# Patient Record
Sex: MALE | Race: WHITE | NOT HISPANIC OR LATINO | Employment: OTHER | URBAN - METROPOLITAN AREA
[De-identification: names, ages, dates, MRNs, and addresses within clinical notes are randomized per-mention and may not be internally consistent; named-entity substitution may affect disease eponyms.]

---

## 2017-06-01 ENCOUNTER — AMBULATORY - RIVER FALLS (OUTPATIENT)
Dept: FAMILY MEDICINE | Facility: CLINIC | Age: 67
End: 2017-06-01

## 2017-06-01 ENCOUNTER — COMMUNICATION - RIVER FALLS (OUTPATIENT)
Dept: FAMILY MEDICINE | Facility: CLINIC | Age: 67
End: 2017-06-01

## 2017-06-02 LAB
CHOLEST SERPL-MCNC: 158 MG/DL (ref 125–200)
CHOLEST/HDLC SERPL: 2.7 {RATIO}
CREAT SERPL-MCNC: 0.83 MG/DL (ref 0.7–1.25)
GLUCOSE BLD-MCNC: 112 MG/DL (ref 65–99)
HBA1C MFR BLD: 5.8 %
HDLC SERPL-MCNC: 59 MG/DL
LDLC SERPL CALC-MCNC: 75 MG/DL
NONHDLC SERPL-MCNC: 99 MG/DL
TRIGL SERPL-MCNC: 122 MG/DL

## 2017-06-14 ENCOUNTER — OFFICE VISIT - RIVER FALLS (OUTPATIENT)
Dept: FAMILY MEDICINE | Facility: CLINIC | Age: 67
End: 2017-06-14

## 2017-12-26 ENCOUNTER — AMBULATORY - RIVER FALLS (OUTPATIENT)
Dept: FAMILY MEDICINE | Facility: CLINIC | Age: 67
End: 2017-12-26

## 2017-12-27 ENCOUNTER — AMBULATORY - RIVER FALLS (OUTPATIENT)
Dept: FAMILY MEDICINE | Facility: CLINIC | Age: 67
End: 2017-12-27

## 2017-12-27 LAB — HBA1C MFR BLD: 5.6 %

## 2018-01-12 ENCOUNTER — OFFICE VISIT - RIVER FALLS (OUTPATIENT)
Dept: FAMILY MEDICINE | Facility: CLINIC | Age: 68
End: 2018-01-12

## 2018-09-10 ENCOUNTER — AMBULATORY - RIVER FALLS (OUTPATIENT)
Dept: FAMILY MEDICINE | Facility: CLINIC | Age: 68
End: 2018-09-10

## 2018-09-11 LAB
CHOLEST SERPL-MCNC: 147 MG/DL
CHOLEST/HDLC SERPL: 2.9 {RATIO}
CREAT SERPL-MCNC: 0.84 MG/DL (ref 0.7–1.25)
GLUCOSE BLD-MCNC: 114 MG/DL (ref 65–99)
HBA1C MFR BLD: 5.6 %
HDLC SERPL-MCNC: 51 MG/DL
LDLC SERPL CALC-MCNC: 77 MG/DL
NONHDLC SERPL-MCNC: 96 MG/DL
TRIGL SERPL-MCNC: 106 MG/DL

## 2018-09-13 ENCOUNTER — OFFICE VISIT - RIVER FALLS (OUTPATIENT)
Dept: FAMILY MEDICINE | Facility: CLINIC | Age: 68
End: 2018-09-13

## 2018-09-27 ENCOUNTER — OFFICE VISIT - RIVER FALLS (OUTPATIENT)
Dept: FAMILY MEDICINE | Facility: CLINIC | Age: 68
End: 2018-09-27

## 2018-09-27 ASSESSMENT — MIFFLIN-ST. JEOR: SCORE: 2225.45

## 2019-05-07 ENCOUNTER — AMBULATORY - RIVER FALLS (OUTPATIENT)
Dept: FAMILY MEDICINE | Facility: CLINIC | Age: 69
End: 2019-05-07

## 2019-05-08 LAB
25(OH)D3 SERPL-MCNC: 29 NG/ML (ref 30–100)
HBA1C MFR BLD: 5.7 %
PSA SERPL-MCNC: 0.7 NG/ML

## 2019-05-09 ENCOUNTER — COMMUNICATION - RIVER FALLS (OUTPATIENT)
Dept: FAMILY MEDICINE | Facility: CLINIC | Age: 69
End: 2019-05-09

## 2019-05-14 ENCOUNTER — OFFICE VISIT - RIVER FALLS (OUTPATIENT)
Dept: FAMILY MEDICINE | Facility: CLINIC | Age: 69
End: 2019-05-14

## 2019-10-16 ENCOUNTER — AMBULATORY - RIVER FALLS (OUTPATIENT)
Dept: FAMILY MEDICINE | Facility: CLINIC | Age: 69
End: 2019-10-16

## 2019-12-04 ENCOUNTER — AMBULATORY - RIVER FALLS (OUTPATIENT)
Dept: FAMILY MEDICINE | Facility: CLINIC | Age: 69
End: 2019-12-04

## 2019-12-05 ENCOUNTER — COMMUNICATION - RIVER FALLS (OUTPATIENT)
Dept: FAMILY MEDICINE | Facility: CLINIC | Age: 69
End: 2019-12-05

## 2019-12-05 LAB
BUN SERPL-MCNC: 19 MG/DL (ref 7–25)
BUN/CREAT RATIO - HISTORICAL: ABNORMAL (ref 6–22)
CALCIUM SERPL-MCNC: 9.8 MG/DL (ref 8.6–10.3)
CHLORIDE BLD-SCNC: 96 MMOL/L (ref 98–110)
CHOLEST SERPL-MCNC: 134 MG/DL
CHOLEST/HDLC SERPL: 2.7 {RATIO}
CO2 SERPL-SCNC: 31 MMOL/L (ref 20–32)
CREAT SERPL-MCNC: 0.9 MG/DL (ref 0.7–1.18)
EGFRCR SERPLBLD CKD-EPI 2021: 86 ML/MIN/1.73M2
GLUCOSE BLD-MCNC: 109 MG/DL (ref 65–99)
HDLC SERPL-MCNC: 49 MG/DL
LDLC SERPL CALC-MCNC: 66 MG/DL
NONHDLC SERPL-MCNC: 85 MG/DL
POTASSIUM BLD-SCNC: 4.6 MMOL/L (ref 3.5–5.3)
SODIUM SERPL-SCNC: 135 MMOL/L (ref 135–146)
TRIGL SERPL-MCNC: 109 MG/DL

## 2019-12-11 ENCOUNTER — OFFICE VISIT - RIVER FALLS (OUTPATIENT)
Dept: FAMILY MEDICINE | Facility: CLINIC | Age: 69
End: 2019-12-11

## 2019-12-11 ASSESSMENT — MIFFLIN-ST. JEOR: SCORE: 2222.73

## 2020-06-24 ENCOUNTER — OFFICE VISIT - RIVER FALLS (OUTPATIENT)
Dept: FAMILY MEDICINE | Facility: CLINIC | Age: 70
End: 2020-06-24

## 2020-06-24 ASSESSMENT — MIFFLIN-ST. JEOR: SCORE: 2241.78

## 2021-01-05 ENCOUNTER — OFFICE VISIT - RIVER FALLS (OUTPATIENT)
Dept: FAMILY MEDICINE | Facility: CLINIC | Age: 71
End: 2021-01-05

## 2021-01-06 ENCOUNTER — COMMUNICATION - RIVER FALLS (OUTPATIENT)
Dept: FAMILY MEDICINE | Facility: CLINIC | Age: 71
End: 2021-01-06

## 2021-01-06 LAB
BUN SERPL-MCNC: 15 MG/DL (ref 7–25)
BUN/CREAT RATIO - HISTORICAL: ABNORMAL (ref 6–22)
CALCIUM SERPL-MCNC: 9.9 MG/DL (ref 8.6–10.3)
CHLORIDE BLD-SCNC: 96 MMOL/L (ref 98–110)
CHOLEST SERPL-MCNC: 154 MG/DL
CHOLEST/HDLC SERPL: 2.9 {RATIO}
CO2 SERPL-SCNC: 34 MMOL/L (ref 20–32)
CREAT SERPL-MCNC: 0.88 MG/DL (ref 0.7–1.18)
EGFRCR SERPLBLD CKD-EPI 2021: 86 ML/MIN/1.73M2
GLUCOSE BLD-MCNC: 109 MG/DL (ref 65–99)
HBA1C MFR BLD: 5.6 %
HDLC SERPL-MCNC: 54 MG/DL
LDLC SERPL CALC-MCNC: 77 MG/DL
NONHDLC SERPL-MCNC: 100 MG/DL
POTASSIUM BLD-SCNC: 4.3 MMOL/L (ref 3.5–5.3)
SODIUM SERPL-SCNC: 136 MMOL/L (ref 135–146)
TRIGL SERPL-MCNC: 133 MG/DL

## 2021-07-20 ENCOUNTER — AMBULATORY - RIVER FALLS (OUTPATIENT)
Dept: FAMILY MEDICINE | Facility: CLINIC | Age: 71
End: 2021-07-20

## 2021-07-21 ENCOUNTER — COMMUNICATION - RIVER FALLS (OUTPATIENT)
Dept: FAMILY MEDICINE | Facility: CLINIC | Age: 71
End: 2021-07-21

## 2021-07-21 LAB — HBA1C MFR BLD: 5.5 %

## 2021-07-27 ENCOUNTER — OFFICE VISIT - RIVER FALLS (OUTPATIENT)
Dept: FAMILY MEDICINE | Facility: CLINIC | Age: 71
End: 2021-07-27

## 2021-07-27 ENCOUNTER — TRANSFERRED RECORDS (OUTPATIENT)
Dept: MULTI SPECIALTY CLINIC | Facility: CLINIC | Age: 71
End: 2021-07-27

## 2021-07-27 ASSESSMENT — MIFFLIN-ST. JEOR: SCORE: 2229.08

## 2022-02-11 VITALS
SYSTOLIC BLOOD PRESSURE: 137 MMHG | HEART RATE: 73 BPM | WEIGHT: 315 LBS | DIASTOLIC BLOOD PRESSURE: 82 MMHG | BODY MASS INDEX: 44.1 KG/M2 | HEIGHT: 71 IN

## 2022-02-11 VITALS
BODY MASS INDEX: 43.21 KG/M2 | WEIGHT: 309.8 LBS | HEART RATE: 68 BPM | DIASTOLIC BLOOD PRESSURE: 75 MMHG | SYSTOLIC BLOOD PRESSURE: 116 MMHG | TEMPERATURE: 97.6 F

## 2022-02-11 VITALS
BODY MASS INDEX: 43.68 KG/M2 | TEMPERATURE: 98.5 F | WEIGHT: 313.2 LBS | DIASTOLIC BLOOD PRESSURE: 85 MMHG | HEART RATE: 65 BPM | SYSTOLIC BLOOD PRESSURE: 130 MMHG

## 2022-02-11 VITALS
HEIGHT: 71 IN | BODY MASS INDEX: 44.1 KG/M2 | OXYGEN SATURATION: 97 % | BODY MASS INDEX: 44.52 KG/M2 | SYSTOLIC BLOOD PRESSURE: 116 MMHG | HEART RATE: 76 BPM | SYSTOLIC BLOOD PRESSURE: 125 MMHG | HEART RATE: 70 BPM | TEMPERATURE: 98.1 F | WEIGHT: 315 LBS | WEIGHT: 315 LBS | DIASTOLIC BLOOD PRESSURE: 82 MMHG | DIASTOLIC BLOOD PRESSURE: 90 MMHG

## 2022-02-11 VITALS
DIASTOLIC BLOOD PRESSURE: 79 MMHG | BODY MASS INDEX: 44.1 KG/M2 | HEIGHT: 71 IN | WEIGHT: 315 LBS | TEMPERATURE: 96.7 F | SYSTOLIC BLOOD PRESSURE: 123 MMHG | HEART RATE: 62 BPM

## 2022-02-11 VITALS
BODY MASS INDEX: 42.87 KG/M2 | WEIGHT: 307.4 LBS | TEMPERATURE: 97.2 F | HEART RATE: 62 BPM | DIASTOLIC BLOOD PRESSURE: 84 MMHG | SYSTOLIC BLOOD PRESSURE: 137 MMHG

## 2022-02-11 VITALS
SYSTOLIC BLOOD PRESSURE: 123 MMHG | HEIGHT: 71 IN | TEMPERATURE: 97.9 F | BODY MASS INDEX: 44.1 KG/M2 | WEIGHT: 315 LBS | DIASTOLIC BLOOD PRESSURE: 87 MMHG | HEART RATE: 81 BPM

## 2022-02-11 VITALS
WEIGHT: 315 LBS | TEMPERATURE: 97.4 F | BODY MASS INDEX: 45.47 KG/M2 | DIASTOLIC BLOOD PRESSURE: 72 MMHG | HEART RATE: 64 BPM | SYSTOLIC BLOOD PRESSURE: 116 MMHG

## 2022-02-16 NOTE — TELEPHONE ENCOUNTER
Entered by Beth Marroquin CMA on July 01, 2021 6:13:34 AM CDT  ---------------------  From: Beth Marroquin CMA   To: [id number]    Sent: 7/1/2021 6:13:33 AM CDT  Subject: Medication Management     ** Submitted: **  Order:lovastatin (lovastatin 40 mg oral tablet)  1 tab(s)  Oral  daily  Qty:  30 tab(s)        Refills:  0          Substitutions Allowed     [id number]    Signed by Beth Marroquin CMA  7/1/2021 11:13:00 AM [facility]    ** Submitted: **  Complete:lovastatin (lovastatin 40 mg oral tablet)   Signed by Beth Marroquin CMA  7/1/2021 11:13:00 AM [facility]    ** Not Approved:  **  lovastatin (LOVASTATIN 40MG TABLETS)  TAKE 1 TABLET BY MOUTH DAILY  Qty:  90 tab(s)        Days Supply:  90        Refills:  0          Substitutions Allowed     [id number]   Signed by Beth Marroquin CMA            ** Submitted: **  Order:losartan (losartan 25 mg oral tablet)  1 tab(s)  Oral  daily  Qty:  30 tab(s)        Refills:  0          Substitutions Allowed     Route To [facility] #[id number]    Signed by Beth Marroquin CMA  7/1/2021 11:12:00 AM [facility]    ** Submitted: **  Complete:losartan (losartan 25 mg oral tablet)   Signed by Beth Marroquin CMA  7/1/2021 11:12:00 AM [facility]    ** Not Approved:  **  losartan (LOSARTAN 25MG TABLETS)  TAKE 1 TABLET BY MOUTH DAILY  Qty:  90 tab(s)        Days Supply:  90        Refills:  0          Substitutions Allowed     [id number]   Signed by Beth Marroquin CMA            ** Submitted: **  Order:atenolol-chlorthalidone (atenolol-chlorthalidone 100 mg-25 mg oral tablet)  1 tab(s)  Oral  daily  Qty:  30 tab(s)        Refills:  0          Substitutions Allowed     Route To [facility] #[id number]    Signed by Beth Marroquin CMA  7/1/2021 11:12:00 AM [facility]    ** Submitted: **  Complete:atenolol-chlorthalidone (atenolol-chlorthalidone 100 mg-25 mg oral tablet)   Signed by Lopez  Beth CONCEPCION  7/1/2021 11:12:00 AM [facility]    ** Not Approved:  **  atenolol-chlorthalidone (ATENOLOL 100MG/CHLORTHAL 25MG TABS)  TAKE 1 TABLET BY MOUTH DAILY  Qty:  90 tab(s)        Days Supply:  90        Refills:  0          Substitutions Allowed     Route To Pharmacy - [facility] #[id number]   Signed by Beth Marroquin CMA            ------------------------------------------  From: [facility] #[id number]  To: Otilio Moya MD  Sent: June 30, 2021 4:41:16 PM CDT  Subject: Medication Management  Due: June 4, 2021 8:26:15 PM CDT     ** On Hold Pending Signature **     Dispensed Drug: atenolol-chlorthalidone (atenolol-chlorthalidone 100 mg-25 mg oral tablet), TAKE 1 TABLET BY MOUTH DAILY  Quantity: 90 tab(s)  Days Supply: 90  Refills: 0  Substitutions Allowed  Notes from Pharmacy:     ** On Hold Pending Signature **     Dispensed Drug: losartan (losartan 25 mg oral tablet), TAKE 1 TABLET BY MOUTH DAILY  Quantity: 90 tab(s)  Days Supply: 90  Refills: 0  Substitutions Allowed  Notes from Pharmacy:     ** On Hold Pending Signature **     Dispensed Drug: lovastatin (lovastatin 40 mg oral tablet), TAKE 1 TABLET BY MOUTH DAILY  Quantity: 90 tab(s)  Days Supply: 90  Refills: 0  Substitutions Allowed  Notes from Pharmacy:  ------------------------------------------1/5/21 cholesterol management  [facility] July

## 2022-02-16 NOTE — PROGRESS NOTES
Patient:   MARIAH FRANCOIS JR            MRN: [id number]            FIN: [id number]               Age:   68 years     Sex:  Male     :  1950   Associated Diagnoses:   Incomplete RBBB   Author:   Otilio Moya MD      Procedure   EKG procedure   Indication: preop.     Position: supine.     EKG findings   Interpretation: by primary care provider.     No change on resting EKG: since last exam.     Rhythm: sinus.     Axis: normal axis, normal configuration.     Intervals: NJ normal, QRS normal, QT normal.     P waves: normal.     ST-T-U complex.     Interpretation: abnormal incomplete rbbb.     Discussed: with patient.     Not normal EKG.        Impression and Plan   Diagnosis     Incomplete RBBB (DEG83-MW I45.10).     Orders

## 2022-02-16 NOTE — NURSING NOTE
Quick Intake Entered On:  12/11/2019 8:51 AM CST    Performed On:  12/11/2019 8:50 AM CST by Mayelin Bajwa               Summary   Advance Directive :   No   Height Measured :   71 in(Converted to: 5 ft 11 in, 180.34 cm)    Systolic Blood Pressure :   137 mmHg (HI)    Diastolic Blood Pressure :   82 mmHg (HI)    Mean Arterial Pressure :   100 mmHg   Peripheral Pulse Rate :   73 bpm   BP Site :   Right arm   BP Method :   Manual   HR Method :   Electronic   Race :      Languages :   English   Ethnicity :   Not  or    Mayelin Bajwa - 12/11/2019 8:50 AM CST

## 2022-02-16 NOTE — TELEPHONE ENCOUNTER
Entered by Luli Lomax LPN on October 19, 2021 9:38:38 AM CDT  ---------------------  From: Luli Lomax LPN   To: [facility] #[id number]    Sent: 10/19/2021 9:38:38 AM CDT  Subject: Medication Management     ** Not Approved: Refill not appropriate, Rx sent 7/27/21 #90 with 1 refill **  potassium chloride (POTASSIUM CL 20MEQ ER TABLETS)  TAKE 1 TABLET BY MOUTH DAILY  Qty:  90 tab(s)        Days Supply:  90        Refills:  0          Substitutions Allowed     Route To Pharmacy - [facility] #[id number]   Signed by Luli Lomax LPN            ------------------------------------------  From: [facility] #[id number]  To: Otilio Moya MD  Sent: October 14, 2021 5:15:29 AM CDT  Subject: Medication Management  Due: October 14, 2021 5:08:38 PM CDT     ** On Hold Pending Signature **     Dispensed Drug: potassium chloride (Potassium Chloride (Eqv-Klor-Con M20) 20 mEq oral tablet, extended release), TAKE 1 TABLET BY MOUTH DAILY  Quantity: 90 tab(s)  Days Supply: 90  Refills: 0  Substitutions Allowed  Notes from Pharmacy:  ------------------------------------------

## 2022-02-16 NOTE — PROGRESS NOTES
Patient:   MARIAH FRANCOIS JR            MRN: [id number]            FIN: [id number]               Age:   70 years     Sex:  Male     :  1950   Associated Diagnoses:   HTN (Hypertension); Vitamin D Deficiency; Prediabetes; TAWANDA (obstructive sleep apnea); Obesity; Nonalcoholic steatohepatitis (NUÑEZ); Adenoma; Aneurysm of thoracic aorta; Celiac artery dissection   Author:   Otilio Moya MD      Visit Information      Date of Service: 2021 10:28 am  Performing Location: [facility]  Encounter#: [id number]      Primary Care Provider (PCP):  Otilio Moya MD    NPI# [id number]      Referring Provider:  Otilio Moya MD# [id number]      Chief Complaint   2021 10:38 AM CST    HTN f/u. Pt is fasting.        History of Present Illness             Interval History   Some djd issues especially back Sees chiro  See cards for aortic dilitation  using cpap      Review of Systems   Constitutional:  Negative.    Eye:  Negative.    Ear/Nose/Mouth/Throat:  Negative.    Respiratory:  Negative.    Cardiovascular:  Negative.    Gastrointestinal:  Negative.    Genitourinary:  Negative.    Hematology/Lymphatics:  Negative.    Endocrine:  Negative.    Immunologic:  Negative.    Musculoskeletal:  Negative except as documented in history of present illness.    Integumentary:  Negative.    Neurologic:  Negative.       Health Status   Allergies:    Nonallergic Reactions (Selected)  Severity Not Documented  Zestril (Cough)   Medications:  (Selected)   Prescriptions  Prescribed  atenolol-chlorthalidone 100 mg-25 mg oral tablet: 1 tab(s), Oral, daily, # 30 tab(s), 0 Refill(s), Type: Maintenance, Pharmacy: [facility] #[id number], appt due, 1 tab(s) Oral daily, 71, in, 20 12:44:00 CDT, Height Measured, 324, lb, 20 12:44:00 CDT, Weight Measured  losartan 25 mg oral tablet: = 1 tab(s), Oral, daily, # 30 tab(s), 0 Refill(s), Type: Maintenance, Pharmacy: [facility] #[id number],  appt due, 1 tab(s) Oral daily, 71, in, 06/24/20 12:44:00 CDT, Height Measured, 324, lb, 06/24/20 12:44:00 CDT, Weight Measured  lovastatin 40 mg oral tablet: = 1 tab(s), Oral, daily, # 30 tab(s), 0 Refill(s), Type: Maintenance, Pharmacy: [facility] #[id number], appt due, 1 tab(s) Oral daily, 71, in, 06/24/20 12:44:00 CDT, Height Measured, 324, lb, 06/24/20 12:44:00 CDT, Weight Measured  omeprazole 20 mg oral delayed release capsule: = 1 cap(s) ( 20 mg ), Oral, daily, PRN: for heartburn, # 90 cap(s), 1 Refill(s), Type: Maintenance, Pharmacy: [facility] #[id number], 1 cap(s) Oral daily,PRN:for heartburn  potassium chloride 20 mEq oral tablet, extended release: = 1 tab(s) ( 20 mEq ), po, daily, # 90 tab(s), 1 Refill(s), Type: Maintenance, Pharmacy: [facility] #[id number], 1 tab(s) Oral daily, 71, in, 06/24/20 12:44:00 CDT, Height Measured, 324, lb, 06/24/20 12:44:00 CDT, Weight Measured  Documented Medications  Documented  Multiple Vitamins oral tablet: 1 tab(s), Oral, daily, 0 Refill(s), Type: Maintenance  aspirin 325 mg oral tablet: 1 tab(s) ( 325 mg ), po, daily, 0 Refill(s), Type: Maintenance,    Medications          *denotes recorded medication          *aspirin 325 mg oral tablet: 325 mg, 1 tab(s), po, daily, 0 Refill(s).          atenolol-chlorthalidone 100 mg-25 mg oral tablet: 1 tab(s), Oral, daily, 30 tab(s), 0 Refill(s).          losartan 25 mg oral tablet: 1 tab(s), Oral, daily, 30 tab(s), 0 Refill(s).          lovastatin 40 mg oral tablet: 1 tab(s), Oral, daily, 30 tab(s), 0 Refill(s).          *Multiple Vitamins oral tablet: 1 tab(s), Oral, daily, 0 Refill(s).          omeprazole 20 mg oral delayed release capsule: 20 mg, 1 cap(s), Oral, daily, PRN: for heartburn, 90 cap(s), 1 Refill(s).          potassium chloride 20 mEq oral tablet, extended release: 20 mEq, 1 tab(s), po, daily, 90 tab(s), 1 Refill(s).       Problem list:    All Problems (Selected)  Adenoma / ICD-9-.9 /  Confirmed  Aneurysm of thoracic aorta / SNOMED CT 8907875613 / Confirmed  Hemorrhoids, Internal / ICD-9-.0 / Confirmed  HTN (hypertension) / SNOMED CT 1210510837 / Confirmed  Lipids abnormal / ICD-9-.9 / Confirmed  Nonalcoholic steatohepatitis (NUÑEZ) / SNOMED CT 6387196167 / Confirmed  Obesity / SNOMED CT 9330656112 / Probable  TAWANDA (obstructive sleep apnea) / SNOMED CT 848765663 / Confirmed  ED (erectile dysfunction) / SNOMED CT 5673812454 / Confirmed  Prediabetes / SNOMED CT 792421137 / Confirmed  Incomplete RBBB / SNOMED CT 10353394 / Confirmed  Acquired polycythemia / SNOMED CT 162750528 / Confirmed  Seasonal allergies / SNOMED CT 909988860 / Confirmed  Status post skin graft / ICD-9-CM V42.3 / Confirmed  Vitamin D Deficiency / SNOMED CT 90976715 / Confirmed      Histories   Past Medical History:    Active  TAWANDA (obstructive sleep apnea) (238125289): Onset on 2015 at 64 years.  Status post skin graft (V42.3): Onset on 2012 at 62 years.  Adenoma (229.9): Onset in the month of 2009 at 58 years  Hemorrhoids, Internal (455.0): Onset in  at 58 years.  HTN (hypertension) (7269697025)  ED (erectile dysfunction) (2812813983)  Obesity (0177596906)  Seasonal allergies (538474556)  Resolved  History of chicken pox (287205123):  Resolved.   Family History:    Dementia  Mother (unknown, )  Leukemia  Father ()  Pacemaker  Mother (unknown, )     Procedure history:    Colonoscopy (SNOMED CT 701635705) performed by Stan Wall MD on 10/4/2018 at 68 Years.  Comments:  10/11/2018 8:28 AM CDT - Maru Ramirez  Indication:  History of polyps.  Sedation:  MAC.  Impression:  One 5 m polyp in rectum.  Diverticulosis in sigmoid colon.  Cardiac computed tomography for calcium scoring (SNOMED CT 1834847546) on 2016 at 65 Years.  Comments:  2016 10:42 AM CDT - Hien Rodas  Coronary arterial calcium score is 0  colonoscopy performed by Rivera Lechuga MD on 2015  at 64 Years.  Comments:  7/3/2015 3:17 PM CDT - Beatriz Meneses RN  Advanced adenoma    7/3/2015 2:58 PM CDT - Beatriz Meneses RN  Sedation: Propofol  Findings: Sigmoid diverticulosis, tubular adenoma  F/U repeat colonoscopy in 3 years.  Polysomnogram (SNOMED CT 257190176) on 5/13/2015 at 64 Years.  Comments:  6/17/2015 1:12 PM CDT - Renu Wheat CMA  AHI of 21/hr; increasing to 49/hr during REM  colonoscopy on 9/3/2009 at 58 Years.  Comments:  6/18/2010 1:06 PM CDT - Deyanira Slaughter  repeat 2012  BCC (basal cell carcinoma), left leg (ICD-9-.71) performed by Gil De La Cruz MD on 10/6/2008 at 58 Years.  BCC (basal cell carcinoma), right shoulder (ICD-9-.61) performed by Gil De La Cruz MD on 10/6/2008 at 58 Years.  BCC (basal cell carcinoma),  right skull (ICD-9-.91) performed by Gil De La Cruz MD on 10/6/2008 at 58 Years.  Stress test ECG in 1990 at 40 Years.  Tonsillectomy.   Social History:        Electronic Cigarette/Vaping Assessment            Electronic Cigarette Use: Never.      Alcohol Assessment: Current            Current, 1-2 times per week, 2 drinks/episode average.  2 drinks/episode maximum.      Tobacco Assessment: Past            Former smoker, quit more than 30 days ago, Snuff      Substance Abuse Assessment: Denies Substance Abuse            Never      Employment and Education Assessment            Retired      Home and Environment Assessment            Marital status: .  Spouse/Partner name: Oneyda..  3 children.      Nutrition and Health Assessment            Type of diet: Regular.      Exercise and Physical Activity Assessment: Does not exercise                     Comments:                      12/08/2016 - Hien Rodas                     No regular exercise        Physical Examination   Vital Signs   1/5/2021 10:38 AM CST Temperature Tympanic 97.4 DegF  LOW    Peripheral Pulse Rate 64 bpm    Pulse Site Radial artery    HR Method Manual    Systolic Blood  Pressure 116 mmHg    Diastolic Blood Pressure 72 mmHg    Mean Arterial Pressure 87 mmHg    BP Site Right arm    BP Method Manual      Measurements from flowsheet : Measurements   1/5/2021 10:38 AM CST    Weight Measured - Standard                326 lb     General:  Alert and oriented, No acute distress.    Eye:  Normal conjunctiva.    HENT:  Normocephalic, Tympanic membranes are clear, Oral mucosa is moist, No pharyngeal erythema.    Neck:  Supple, Non-tender, No lymphadenopathy, No thyromegaly.    Respiratory:  Breath sounds are equal, Symmetrical chest wall expansion.         Respirations: Are within normal limits.         Pattern: Regular.         Breath sounds: Bilateral, Within normal limits.    Cardiovascular:  Normal rate, Regular rhythm, No murmur, Normal peripheral perfusion, No edema.    Gastrointestinal:  Soft, Non-tender, Non-distended, Normal bowel sounds, No organomegaly.    Genitourinary:  No costovertebral angle tenderness.    Musculoskeletal:  degenerative changes noted.    Integumentary:  Warm, Dry, No rash, see below.    Neurologic:  Alert, Oriented.       Health Maintenance      Recommendations     Pending (in the next year)        OverDue           CBC due  08/21/13  and every 12  month(s)           CMP due  08/21/13  and every 12  month(s)           PSA due  08/21/13  and every 12  month(s)           Alcohol Misuse Screen (Male) due  12/08/17  and every 1  year(s)           Depression Screen (Male) due  12/08/17  and every 1  year(s)           Type 2 Diabetes Mellitus Screen (Male) due  05/07/20  and every 1  year(s)           Lipid Disorders Screen (Male) due  12/04/20  and every 1  year(s)        Due            Abdominal Aortic Aneurysm Screen (if hx of smoking) due  01/05/21  One-time only           Fall Risk Screen (Male) due  01/05/21  and every 1  year(s)           Hepatitis C Screen 7070-6062 (Male) due  01/05/21  One-time only           Lung Cancer Screen (Male) due  01/05/21  and  every 1  year(s)        Due In Future            Body Mass Index Check (Male) not due until  06/24/21  and every 1  year(s)           High Blood Pressure Screen (Male) not due until  06/24/21  and every 1  year(s)           Obesity Screen and Counseling (Male) not due until  06/24/21  and every 1  year(s)           Tobacco Use Screen (Male) not due until  06/24/21  and every 1  year(s)           Influenza Vaccine not due until  09/01/21  and every 1  year(s)           Colorectal Cancer Screen (Colonoscopy) (Male) not due until  10/04/21  and every 3  year(s)     Satisfied (in the past 1 year)        Satisfied            Body Mass Index Check (Male) on  06/24/20.           High Blood Pressure Screen (Male) on  06/24/20.           Influenza Vaccine on  09/15/20.           Obesity Screen and Counseling (Male) on  06/24/20.           Tobacco Use Screen (Male) on  06/24/20.          Review / Management   Results review      Impression and Plan   Diagnosis     HTN (Hypertension) (YAA99-VT I10).     Vitamin D Deficiency (VZD41-TI E55.9).     Prediabetes (UJH57-VP R73.09).     TAWANDA (obstructive sleep apnea) (RSG74-NN G47.33).     Obesity (KSV72-VX E66.9).     Nonalcoholic steatohepatitis (NUÑEZ) (RWE36-RV K75.81).     Adenoma (FUS59-KD D36.9).     Aneurysm of thoracic aorta (BJD17-VW I71.2).     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     Course:  Progressing as expected.    Plan:  1.  Hypertension under reasonable control on atenolol chlorthalidone and losartan  2.  Hyperlipidemia under reasonable control with lovastatin  3 chronic reflux stable on omeprazole No. 4 obesity needs to work on weight loss  5.  Prediabetes needs an A1c  6.  History of adenomatous colon polyps due for colonoscopy in 2023  7 polycythemia negative follow-ups  8.  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    Patient Instructions:       Counseled: Patient, Regarding diagnosis, Regarding treatment, Regarding medications, Diet, Activity.

## 2022-02-16 NOTE — PROGRESS NOTES
Patient:   MARIAH FRANCOIS JR            MRN: [id number]            FIN: [id number]               Age:   69 years     Sex:  Male     :  1950   Associated Diagnoses:   HTN (Hypertension); Vitamin D Deficiency; Prediabetes; TAWANDA (obstructive sleep apnea); Obesity; Nonalcoholic steatohepatitis (NUÑEZ); Adenoma; Aneurysm of thoracic aorta; Celiac artery dissection   Author:   Otilio Moya MD      Visit Information      Date of Service: 2019 08:42 am  Performing Location: [facility]  Encounter#: [id number]      Primary Care Provider (PCP):  Otilio Moya MD    NPLENIN# [id number]      Referring Provider:  Otilio Moya MD# [id number]      Chief Complaint   2019 8:49 AM CST   Pt here today for med check and lab f/up        History of Present Illness             The patient presents for follow-up evaluation of hypertension.  The context of the hypertension: blood pressure was maintained within the target range.  Exacerbating factors consist of none.  Relieving factors consist of medication.  Associated symptoms consist of none.        Interval History   Cholesterol Management   Total cholesterol results < 200 mg/dL (optimal).  LDL cholesterol results < 100 mg/dL (optimal).  Associated symptoms characterized by no fatigue, chest pain, joint pain, muscle weakness or myalgias.     Some djd issues especially back Sees chiro  See cards for aortic dilitation  Not using cpap      Review of Systems   Constitutional:  Negative.    Eye:  Negative.    Ear/Nose/Mouth/Throat:  Negative.    Respiratory:  Negative.    Cardiovascular:  Negative.    Gastrointestinal:  Negative.    Genitourinary:  Negative.    Hematology/Lymphatics:  Negative.    Endocrine:  Negative.    Immunologic:  Negative.    Musculoskeletal:  Negative except as documented in history of present illness.    Integumentary:  Negative.    Neurologic:  Negative.       Health Status   Allergies:    Nonallergic Reactions  (Selected)  Severity Not Documented  Zestril (Cough)   Medications:  (Selected)   Prescriptions  Prescribed  atenolol-chlorthalidone 100 mg-25 mg oral tablet: 1 tab(s), po, daily, # 90 tab(s), 1 Refill(s), Type: Maintenance, Pharmacy: [facility] [id number], 1 tab(s) Oral daily  losartan 25 mg oral tablet: = 1 tab(s) ( 25 mg ), Oral, daily, # 90 tab(s), 1 Refill(s), Type: Maintenance, Pharmacy: [facility] [id number], 1 tab(s) Oral daily  lovastatin 40 mg oral tablet: = 1 tab(s) ( 40 mg ), Oral, daily, # 90 tab(s), 1 Refill(s), Type: Maintenance, Pharmacy: [facility] [id number], 1 tab(s) Oral daily  omeprazole 20 mg oral delayed release capsule: = 1 cap(s) ( 20 mg ), Oral, daily, PRN: for heartburn, # 90 cap(s), 1 Refill(s), Type: Maintenance, Pharmacy: [facility] [id number], 1 cap(s) Oral daily,PRN:for heartburn  potassium chloride 20 mEq oral tablet, extended release: = 1 tab(s) ( 20 mEq ), po, daily, # 90 tab(s), 1 Refill(s), Type: Maintenance, Pharmacy: [facility] [id number], 1 tab(s) Oral daily  Documented Medications  Documented  Multiple Vitamins oral tablet: 1 tab(s), Oral, daily, 0 Refill(s), Type: Maintenance  aspirin 325 mg oral tablet: 1 tab(s) ( 325 mg ), po, daily, 0 Refill(s), Type: Maintenance,    Medications          *denotes recorded medication          *aspirin 325 mg oral tablet: 325 mg, 1 tab(s), po, daily, 0 Refill(s).          atenolol-chlorthalidone 100 mg-25 mg oral tablet: 1 tab(s), po, daily, 90 tab(s), 1 Refill(s).          losartan 25 mg oral tablet: 25 mg, 1 tab(s), Oral, daily, 90 tab(s), 1 Refill(s).          lovastatin 40 mg oral tablet: 40 mg, 1 tab(s), Oral, daily, 90 tab(s), 1 Refill(s).          *Multiple Vitamins oral tablet: 1 tab(s), Oral, daily, 0 Refill(s).          omeprazole 20 mg oral delayed release capsule: 20 mg, 1 cap(s), Oral, daily, PRN: for heartburn, 90 cap(s), 1 Refill(s).          potassium chloride 20 mEq oral tablet, extended release: 20  mEq, 1 tab(s), po, daily, 90 tab(s), 1 Refill(s).       Problem list:    All Problems  Adenoma / ICD-9-.9 / Confirmed  Aneurysm of thoracic aorta / SNOMED CT 7449710510 / Confirmed  Hemorrhoids, Internal / ICD-9-.0 / Confirmed  HTN (hypertension) / SNOMED CT 2563502897 / Confirmed  Lipids abnormal / ICD-9-.9 / Confirmed  Nonalcoholic steatohepatitis (NUÑEZ) / SNOMED CT 9413099591 / Confirmed  Obesity / SNOMED CT 0754355445 / Probable  TAWANDA (obstructive sleep apnea) / SNOMED CT 220702736 / Confirmed  ED (erectile dysfunction) / SNOMED CT 8017652111 / Confirmed  Prediabetes / SNOMED CT 106452771 / Confirmed  Incomplete RBBB / SNOMED CT 84328191 / Confirmed  Acquired polycythemia / SNOMED CT 400383123 / Confirmed  Seasonal allergies / SNOMED CT 087985066 / Confirmed  Status post skin graft / ICD-9-CM V42.3 / Confirmed  Vitamin D Deficiency / SNOMED CT 61507149 / Confirmed  Resolved: History of chicken pox / SNOMED CT 105116476      Histories   Past Medical History:    Active  TAWANDA (obstructive sleep apnea) (395441776): Onset on 2015 at 64 years.  Status post skin graft (V42.3): Onset on 2012 at 62 years.  Adenoma (229.9): Onset in the month of 2009 at 58 years  Hemorrhoids, Internal (455.0): Onset in  at 58 years.  HTN (hypertension) (4492353630)  ED (erectile dysfunction) (7648712012)  Obesity (1675095228)  Seasonal allergies (993737158)  Resolved  History of chicken pox (751728457):  Resolved.   Family History:    Dementia  Mother (unknown, )  Leukemia  Father ()  Pacemaker  Mother (unknown, )     Procedure history:    Colonoscopy (SNOMED CT 810664391) performed by Stan Wall MD on 10/4/2018 at 68 Years.  Comments:  10/11/2018 8:28 AM LILIANAT - Maru Ramirez  Indication:  History of polyps.  Sedation:  MAC.  Impression:  One 5 m polyp in rectum.  Diverticulosis in sigmoid colon.  Cardiac computed tomography for calcium scoring (SNOMED CT 5414104067) on  6/20/2016 at 65 Years.  Comments:  6/23/2016 10:42 AM CDT - Hien Rodas  Coronary arterial calcium score is 0  colonoscopy performed by Rivera Lechuga MD on 6/24/2015 at 64 Years.  Comments:  7/3/2015 3:17 PM CDT - Beatriz Meneses RN  Advanced adenoma    7/3/2015 2:58 PM CDT - Beatriz Meneses RN  Sedation: Propofol  Findings: Sigmoid diverticulosis, tubular adenoma  F/U repeat colonoscopy in 3 years.  Polysomnogram (SNOMED CT 933129386) on 5/13/2015 at 64 Years.  Comments:  6/17/2015 1:12 PM CDT - Renu Wheat CMA  AHI of 21/hr; increasing to 49/hr during REM  colonoscopy on 9/3/2009 at 58 Years.  Comments:  6/18/2010 1:06 PM CDT - Deyanira Slaughter  repeat 2012  BCC (basal cell carcinoma), left leg (ICD-9-.71) performed by Gil De La Cruz MD on 10/6/2008 at 58 Years.  BCC (basal cell carcinoma), right shoulder (ICD-9-.61) performed by Gil De La Cruz MD on 10/6/2008 at 58 Years.  BCC (basal cell carcinoma),  right skull (ICD-9-.91) performed by Gil De La Cruz MD on 10/6/2008 at 58 Years.  Stress test ECG in 1990 at 40 Years.  Tonsillectomy.   Social History:        Alcohol Assessment: Current            Current, 1-2 times per week, 2 drinks/episode average.  2 drinks/episode maximum.      Tobacco Assessment: Past            Former smoker, Oral      Substance Abuse Assessment: Denies Substance Abuse            Never      Employment and Education Assessment            Retired      Home and Environment Assessment            Marital status: .  Spouse/Partner name: Oneyda..  3 children.      Nutrition and Health Assessment            Type of diet: Regular.      Exercise and Physical Activity Assessment: Does not exercise                     Comments:                      12/08/2016 - Hien Rodas                     No regular exercise        Physical Examination   Vital Signs   12/11/2019 8:50 AM CST Peripheral Pulse Rate 73 bpm    HR Method Electronic    Systolic Blood Pressure  137 mmHg  HI    Diastolic Blood Pressure 82 mmHg  HI    Mean Arterial Pressure 100 mmHg    BP Site Right arm    BP Method Manual      Measurements from flowsheet : Measurements   12/11/2019 8:50 AM CST Height Measured - Standard 71 in   12/11/2019 8:49 AM CST Height Measured - Standard 71 in    Weight Measured - Standard 319.8 lb    BSA 2.69 m2    Body Mass Index 44.6 kg/m2  HI      General:  Alert and oriented, No acute distress.    Eye:  Normal conjunctiva.    HENT:  Normocephalic, Tympanic membranes are clear, Oral mucosa is moist, No pharyngeal erythema.    Neck:  Supple, Non-tender, No lymphadenopathy, No thyromegaly.    Respiratory:  Breath sounds are equal, Symmetrical chest wall expansion.         Respirations: Are within normal limits.         Pattern: Regular.         Breath sounds: Bilateral, Within normal limits.    Cardiovascular:  Normal rate, Regular rhythm, No murmur, Normal peripheral perfusion, No edema.    Gastrointestinal:  Soft, Non-tender, Non-distended, Normal bowel sounds, No organomegaly.    Genitourinary:  No costovertebral angle tenderness.    Musculoskeletal:  degenerative changes noted.    Integumentary:  Warm, Dry, No rash.    Neurologic:  Alert, Oriented.       Health Maintenance      Recommendations     Pending (in the next year)        OverDue           CBC due  08/21/13  and every 12  month(s)           CMP due  08/21/13  and every 12  month(s)           PSA due  08/21/13  and every 12  month(s)           Alcohol Misuse Screen (Male) due  12/08/17  and every 1  year(s)           Depression Screen (Male) due  12/08/17  and every 1  year(s)        Due            Abdominal Aortic Aneurysm Screen (if hx of smoking) due  12/11/19  One-time only           Fall Risk Screen (Male) due  12/11/19  and every 1  year(s)           Hepatitis C Screen 4237-1527 (Male) due  12/11/19  One-time only           Lung Cancer Screen (Male) due  12/11/19  and every 1  year(s)        Due In Future             Type 2 Diabetes Mellitus Screen (Male) not due until  05/07/20  and every 1  year(s)           Tobacco Use Screen (Male) not due until  05/14/20  and every 1  year(s)           Influenza Vaccine not due until  09/01/20  and every 1  year(s)           Lipid Disorders Screen (Male) not due until  12/04/20  and every 1  year(s)     Satisfied (in the past 1 year)        Satisfied            Body Mass Index Check (Male) on  12/11/19.           High Blood Pressure Screen (Male) on  12/11/19.           High Blood Pressure Screen (Male) on  05/14/19.           Influenza Vaccine on  10/16/19.           Lipid Disorders Screen (Male) on  12/04/19.           Lipid Disorders Screen (Male) on  12/04/19.           Lipid Disorders Screen (Male) on  12/04/19.           Lipid Disorders Screen (Male) on  12/04/19.           Obesity Screen and Counseling (Male) on  12/11/19.           Obesity Screen and Counseling (Male) on  05/14/19.           Tetanus Vaccine on  12/14/18.           Tobacco Use Screen (Male) on  05/14/19.           Type 2 Diabetes Mellitus Screen (Male) on  05/07/19.          Review / Management   Results review:  Lab results   12/4/2019 8:34 AM CST Sodium Level 135 mmol/L    Potassium Level 4.6 mmol/L    Chloride Level 96 mmol/L  LOW    CO2 Level 31 mmol/L    Glucose Level 109 mg/dL  HI    BUN 19 mg/dL    Creatinine Level 0.90 mg/dL    BUN/Creat Ratio NOT APPLICABLE    eGFR 86 mL/min/1.73m2    eGFR African American 100 mL/min/1.73m2    Calcium Level 9.8 mg/dL    Cholesterol 134 mg/dL    Non-HDL Cholesterol 85    HDL 49 mg/dL    Cholesterol/HDL Ratio 2.7    LDL 66    Triglyceride 109 mg/dL   .       Impression and Plan   Diagnosis     HTN (Hypertension) (DQY91-UL I10).     Vitamin D Deficiency (CYW34-FY E55.9).     Prediabetes (QOP34-MP R73.09).     TAWANDA (obstructive sleep apnea) (AGU14-GP G47.33).     Obesity (TOI66-JA E66.9).     Nonalcoholic steatohepatitis (NUÑEZ) (NGJ83-JM K75.81).     Adenoma (XNT14-WK D36.9).      Aneurysm of thoracic aorta (XBM87-OM I71.2).     Celiac artery dissection (STY58-MI I77.79).     Course:  Progressing as expected.    Plan:  Cards fu  Encouraged cpap Declined  Recheck in six months  BMI,Weight loss,exercise,diet discussed   , resume vit d.    Patient Instructions:       Counseled: Patient, Regarding diagnosis, Regarding treatment, Regarding medications, Diet, Activity.

## 2022-02-16 NOTE — TELEPHONE ENCOUNTER
Entered by Fariha Davies CMA on June 11, 2020 4:41:43 PM CDT  ---------------------  From: Fariha Davies CMA   To: [facility] #[id number]    Sent: 6/11/2020 4:41:43 PM CDT  Subject: Medication Management     ** Not Approved: pt due for a visit, contacted and setting up appt **  atenolol-chlorthalidone  TAKE 1 TABLET BY MOUTH DAILY  Qty:  90 tab(s)        Refills:  0          Substitutions Allowed     Details:  90 tab(s), TAKE 1 TABLET BY MOUTH DAILY, Route to Pharmacy [facility] #[id number], 6/10/2020, 3/8/2020, 90, Otilio Moya MD      Route To [facility] #[id number]   Signed by Fariha Davies CMA    ** Not Approved: pt due for a visit, contacted and setting up appt **  lovastatin  TAKE 1 TABLET BY MOUTH DAILY  Qty:  90 tab(s)        Refills:  0          Substitutions Allowed     Details:  90 tab(s), TAKE 1 TABLET BY MOUTH DAILY, Route to Pharmacy [facility] #[id number], 6/10/2020, 3/9/2020, 90, Otilio Moya MD      Route To [facility] #[id number]   Signed by Fariha Davies CMA    ** Not Approved: pt due for a visit, contacted and setting up appt **  losartan  TAKE 1 TABLET BY MOUTH DAILY  Qty:  90 tab(s)        Refills:  0          Substitutions Allowed     Details:  90 tab(s), TAKE 1 TABLET BY MOUTH DAILY, Route to Pharmacy [facility] #[id number], 6/10/2020, 3/9/2020, 90, Otilio Moya MD      Route To [facility] #[id number]   Signed by Fariha Davies CMA          pt due for a visit now   contacted pt and he agrees to come in and was transferred to scheduling      ------------------------------------------  From: [facility] #[id number]  To: Otilio Moya MD  Sent: Tamara 10, 2020 11:59:33 AM CDT  Subject: Medication Management  Due: June 5, 2020 4:01:42 PM CDT     ** On Hold Pending Signature **     Dispensed Drug: atenolol-chlorthalidone (atenolol-chlorthalidone 100 mg-25 mg oral  tablet), TAKE 1 TABLET BY MOUTH DAILY  Quantity: 90 tab(s)  Days Supply: 90  Refills: 0  Substitutions Allowed  Notes from Pharmacy:     ** On Hold Pending Signature **     Dispensed Drug: lovastatin (lovastatin 40 mg oral tablet), TAKE 1 TABLET BY MOUTH DAILY  Quantity: 90 tab(s)  Days Supply: 90  Refills: 0  Substitutions Allowed  Notes from Pharmacy:     ** On Hold Pending Signature **     Dispensed Drug: losartan (losartan 25 mg oral tablet), TAKE 1 TABLET BY MOUTH DAILY  Quantity: 90 tab(s)  Days Supply: 90  Refills: 0  Substitutions Allowed  Notes from Pharmacy:  ------------------------------------------

## 2022-02-16 NOTE — LETTER
(Inserted Image. Unable to display)         December 24, 2020        MARIAH FRANCOIS  [address], [place] [id number]        Dear MARIAH,    Thank you for selecting [facility] for your healthcare needs.    Our records indicate you are due for the following services:     Hypertension check ~ Please remember to bring your at-home blood pressure readings with you to your appointment.     (FYI   Regarding office visits: In some instances, a video visit or telephone visit may be offered as an option.)    To schedule an appointment or if you have further questions, please contact your clinic at [phone].    Powered by [employer]    Sincerely,    Otilio Moya MD   Discharged

## 2022-02-16 NOTE — LETTER
(Inserted Image. Unable to display)     November 15, 2019      MARIAH PRISCILA  [address], [place] [id number]          Dear MARIAH,      Thank you for selecting [facility] ([facility]viously [facility]) for your healthcare needs.    Our records indicate you are due for the following services:    Hypertension check ~ please remember to bring your at-home blood pressure readings with you to your appointment.     Fasting Lab Tests ~ Please do not eat or drink anything 10 hours prior to your scheduled appointment time.  (Water and any medications that you may need are allowed unless directed otherwise.)    If you had your labs done at another facility or with Direct Access Lab Testing at [facility], please bring in a copy of the results to your next visit, mail a copy, or drop off a copy of your results to your Healthcare Provider.    You are due for lab work and an office visit; please schedule the lab appointment 1 week before the office visit.  This will assure all results are available to discuss with your provider during your visit.    **It is very helpful if you bring your medication bottles to your appointment.  This assures we have all of your current medications, including strength and dosing information, documented accurately in your medical record.    To schedule an appointment or if you have further questions, please contact your primary clinic:   [facility]       [phone]   [facility]       [phone]              [facility]     [phone]      Powered by [employer]    Sincerely,    Otilio Moya MD

## 2022-02-16 NOTE — NURSING NOTE
Medicare Visit Entered On:  7/27/2021 9:22 AM CDT    Performed On:  7/27/2021 9:18 AM CDT by Sangita William               Summary   Chief Complaint :   AWV   Advance Directive :   No   Weight Measured :   321.2 lb(Converted to: 321 lb 3 oz, 145.694 kg)    Height Measured :   71 in(Converted to: 5 ft 11 in, 180.34 cm)    Body Mass Index :   44.79 kg/m2 (HI)    Body Surface Area :   2.7 m2   Systolic Blood Pressure :   123 mmHg   Diastolic Blood Pressure :   87 mmHg (HI)    Mean Arterial Pressure :   99 mmHg   Peripheral Pulse Rate :   81 bpm   Vital Signs Comments :   thigh cuff used   BP Method :   Electronic   HR Method :   Electronic   Temperature Tympanic :   97.9 DegF(Converted to: 36.6 DegC)    Race :      Languages :   English   Ethnicity :   Not  or    Sangita William - 7/27/2021 9:18 AM CDT   Health Status   Allergies Verified? :   Yes   Medication History Verified? :   Yes   Immunizations Current :   Yes   Pre-Visit Planning Status :   Completed   Tobacco Use? :   Former smoker   Sangita William - 7/27/2021 9:18 AM CDT   Meds / Allergies   (As Of: 7/27/2021 9:22:34 AM CDT)   Allergies (Active)   Zestril  Estimated Onset Date:   Unspecified ; Reactions:   Cough ; Created By:   Lolis Huggins; Reaction Status:   Active ; Category:   Drug ; Substance:   Zestril ; Type:   Intolerance ; Updated By:   Lolis Huggins; Source:   Paper Chart ; Reviewed Date:   1/5/2021 11:31 AM CST        Medication List   (As Of: 7/27/2021 9:22:34 AM CDT)   Prescription/Discharge Order    lovastatin  :   lovastatin ; Status:   Prescribed ; Ordered As Mnemonic:   lovastatin 40 mg oral tablet ; Simple Display Line:   1 tab(s), Oral, daily, 30 tab(s), 0 Refill(s) ; Ordering Provider:   Otilio Moya MD; Catalog Code:   lovastatin ; Order Dt/Tm:   7/1/2021 6:13:22 AM CDT          atenolol-chlorthalidone  :   atenolol-chlorthalidone ; Status:   Prescribed ; Ordered As Mnemonic:    atenolol-chlorthalidone 100 mg-25 mg oral tablet ; Simple Display Line:   1 tab(s), Oral, daily, 30 tab(s), 0 Refill(s) ; Ordering Provider:   Otilio Moya MD; Catalog Code:   atenolol-chlorthalidone ; Order Dt/Tm:   7/1/2021 6:12:18 AM CDT          losartan  :   losartan ; Status:   Prescribed ; Ordered As Mnemonic:   losartan 25 mg oral tablet ; Simple Display Line:   1 tab(s), Oral, daily, 30 tab(s), 0 Refill(s) ; Ordering Provider:   Otilio Moya MD; Catalog Code:   losartan ; Order Dt/Tm:   7/1/2021 6:12:43 AM CDT          omeprazole  :   omeprazole ; Status:   Processing ; Ordered As Mnemonic:   omeprazole 20 mg oral delayed release capsule ; Ordering Provider:   Otilio Moya MD; Action Display:   Complete ; Catalog Code:   omeprazole ; Order Dt/Tm:   7/27/2021 9:19:24 AM CDT          potassium chloride  :   potassium chloride ; Status:   Prescribed ; Ordered As Mnemonic:   potassium chloride 20 mEq oral tablet, extended release ; Simple Display Line:   20 mEq, 1 tab(s), po, daily, 90 tab(s), 1 Refill(s) ; Ordering Provider:   Otilio Moya MD; Catalog Code:   potassium chloride ; Order Dt/Tm:   1/5/2021 1:03:03 PM [facility]            Home Meds    multivitamin  :   multivitamin ; Status:   Documented ; Ordered As Mnemonic:   Multiple Vitamins oral tablet ; Simple Display Line:   1 tab(s), Oral, daily, 0 Refill(s) ; Catalog Code:   multivitamin ; Order Dt/Tm:   5/14/2019 8:08:36 AM CDT          aspirin  :   aspirin ; Status:   Documented ; Ordered As Mnemonic:   aspirin 325 mg oral tablet ; Simple Display Line:   325 mg, 1 tab(s), po, daily, 0 Refill(s) ; Catalog Code:   aspirin ; Order Dt/Tm:   6/1/2016 10:09:56 AM CDT            Social History   Social History   (As Of: 7/27/2021 9:22:34 AM CDT)   Alcohol:  Current      Current, 1-2 times per week, 2 drinks/episode average.  2 drinks/episode maximum.   (Last Updated: 12/8/2016 4:43:16 PM CST by Hien Rodas)          Tobacco:  Past       Former smoker, quit more than 30 days ago, Snuff   (Last Updated: 1/5/2021 10:38:59 AM CST by Pia Lewis CMA)          Electronic Cigarette/Vaping:        Electronic Cigarette Use: Never.   (Last Updated: 1/5/2021 10:38:50 AM CST by Pia Lewis CMA)          Substance Abuse:  Denies Substance Abuse      Never   (Last Updated: 4/1/2015 10:24:34 AM CDT by Sangita William)          Employment/School:        Retired   (Last Updated: 12/8/2016 11:35:06 AM CST by Jas De La Cruz CMA)          Home/Environment:        Marital status: .  Spouse/Partner name: Oneyda..  3 children.   (Last Updated: 4/1/2015 10:24:25 AM CDT by Sangita William)          Nutrition/Health:        Type of diet: Regular.   (Last Updated: 9/18/2013 10:20:53 AM CDT by Sangita William)          Exercise:  Does not exercise       Comments:  12/8/2016 4:43 PM - Hien Rodas: No regular exercise   (Last Updated: 12/8/2016 4:43:42 PM CST by Hien Rodas)            Geriatric Depression Screening   Geriatric Depression Satisfied Life :   Yes   Geriatric Depression Dropped Activities :   No   Geriatric Depression Life Empty :   No   Geriatric Depression Bored :   No   Geriatric Depression Good Spirits :   Yes   Geriatric Depression Afraid Bad Things :   No   Geriatric Depression Feel Happy :   Yes   Geriatric Depression Feel Helpless :   No   Geriatric Depression Prefer to Stay Home :   No   Geriatric Depression Memory Problems :   No   Geriatric Depression Wonderful Be Alive :   Yes   Geriatric Depression Feel Worthless :   No   Geriatric Depression Situation Hopeless :   No   Geriatric Depression Others Better Off :   No   Geriatric Depression Full of Energy :   Yes   Geriatric Depression Total Score :   0    Sangita William - 7/27/2021 9:18 AM CDT   Home Safety Screen   Emergency Numbers Kept/Updated :   Yes   Aware of Smoking Dangers :   Yes   Smoke Alarms/Fire Extinguisher Available :   Yes   Household Members Fire Safety  Knowledge :   Yes   Firearms Unloaded and Secure :   Yes   Floor Rugs Removed or Fastened :   No   Mats in Bathtub/Shower :   Yes   Stairway Rails or Banisters :   Yes   Outdoor Clutter Safety :   Yes   Indoor Clutter Safety :   Yes   Electrical Cord Safety :   Yes   Sangita William - 7/27/2021 9:18 AM CDT   Advance Directives   Advance Directive :   No   Sangita William - 7/27/2021 9:18 AM CDT   Pelaez Fall Risk   History of Fall in Last 3 Months Pelaez :   No   Sangita William - 7/27/2021 9:18 AM CDT

## 2022-02-16 NOTE — PROGRESS NOTES
Patient:   MARIAH FRANCOIS JR            MRN: [id number]            FIN: [id number]               Age:   68 years     Sex:  Male     :  1950   Associated Diagnoses:   HTN (Hypertension); Vitamin D Deficiency; Prediabetes; TAWANDA (obstructive sleep apnea); Obesity; Nonalcoholic steatohepatitis (NUÑEZ); Adenoma; Aneurysm of thoracic aorta; Celiac artery dissection   Author:   Otilio Moya MD      Visit Information      Date of Service: 2018 08:01 am  Performing Location: [facility]  Encounter#: [id number]      Primary Care Provider (PCP):  Otilio Moya MD    NPI# [id number]      Referring Provider:  Otilio Moya MD# [id number]      Chief Complaint   2018 8:03 AM CDT    HTN        History of Present Illness             The patient presents for follow-up evaluation of hypertension.  The context of the hypertension: blood pressure was maintained within the target range.  Exacerbating factors consist of none.  Relieving factors consist of medication.  Associated symptoms consist of none.        Interval History   Cholesterol Management   Total cholesterol results < 200 mg/dL (optimal).  LDL cholesterol results < 100 mg/dL (optimal).  Associated symptoms characterized by no fatigue, chest pain, joint pain, muscle weakness or myalgias.     Some djd issues  Due for colonoscopy      Review of Systems   Constitutional:  Negative.    Eye:  Negative.    Ear/Nose/Mouth/Throat:  Negative.    Respiratory:  Negative.    Cardiovascular:  Negative.    Gastrointestinal:  Negative.    Genitourinary:  Negative.    Hematology/Lymphatics:  Negative.    Endocrine:  Negative.    Immunologic:  Negative.    Musculoskeletal:  Negative except as documented in history of present illness.    Integumentary:  Negative.    Neurologic:  Negative.       Health Status   Allergies:    Nonallergic Reactions (Selected)  Severity Not Documented  Zestril (Cough)   Medications:  (Selected)    Prescriptions  Prescribed  Norvasc 2.5 mg oral tablet: = 1 tab(s) ( 2.5 mg ), PO, Daily, # 90 tab(s), 1 Refill(s), Type: Maintenance, Pharmacy: [id number], 1 tab(s) Oral daily  atenolol-chlorthalidone 100 mg-25 mg oral tablet: 1 tab(s), po, daily, # 30 tab(s), 0 Refill(s), Type: Maintenance, Pharmacy: [id number], 1 tab(s) Oral daily  lovastatin 40 mg oral tablet: = 1 tab(s) ( 40 mg ), Oral, daily, # 30 tab(s), 0 Refill(s), Type: Maintenance, Pharmacy: [id number]  omeprazole 20 mg oral delayed release capsule: = 1 cap(s) ( 20 mg ), Oral, daily, # 30 cap(s), 0 Refill(s), Type: Maintenance, Pharmacy: [id number]  potassium chloride 20 mEq oral tablet, extended release: 1 tab(s) ( 20 mEq ), po, daily, # 90 tab(s), 1 Refill(s), Type: Maintenance, Pharmacy: [id number], 1 tab(s) po daily,x90 day(s)  Documented Medications  Documented  aspirin 325 mg oral tablet: 1 tab(s) ( 325 mg ), po, daily, 0 Refill(s), Type: Maintenance   Problem list:    All Problems (Selected)  Adenoma / ICD-9-.9 / Confirmed  Aneurysm of thoracic aorta / SNOMED CT 6531003320 / Confirmed  Hemorrhoids, Internal / ICD-9-.0 / Confirmed  HTN (hypertension) / SNOMED CT 5315289433 / Confirmed  Lipids abnormal / ICD-9-.9 / Confirmed  Nonalcoholic steatohepatitis (NUÑEZ) / SNOMED CT 9867052915 / Confirmed  Obesity / SNOMED CT 2558520298 / Probable  TAWANDA (obstructive sleep apnea) / SNOMED CT 447902481 / Confirmed  ED (erectile dysfunction) / SNOMED CT 3709899594 / Confirmed  Prediabetes / SNOMED CT 907848656 / Confirmed  Incomplete RBBB / SNOMED CT 16944602 / Confirmed  Acquired polycythemia / SNOMED CT 673159104 / Confirmed  Seasonal allergies / SNOMED CT 907261780 / Confirmed  Status post skin graft / ICD-9-CM V42.3 / Confirmed  Vitamin D Deficiency / SNOMED CT 74255014 / Confirmed      Histories   Past Medical History:    Active  TAWANDA (obstructive sleep apnea) (206110115): Onset on  2015 at 64 years.  Status post skin graft (V42.3): Onset on 2012 at 62 years.  Adenoma (229.9): Onset in the month of 2009 at 58 years  Hemorrhoids, Internal (455.0): Onset in  at 58 years.  HTN (hypertension) (9930453328)  ED (erectile dysfunction) (6415593317)  Obesity (4634875616)  Seasonal allergies (497573107)  Resolved  History of chicken pox (882019108):  Resolved.   Family History:    Dementia  Mother (unknown, )  Leukemia  Father ()  Pacemaker  Mother (unknown, )     Procedure history:    Cardiac computed tomography for calcium scoring (SNOMED CT 5662306602) on 2016 at 65 Years.  Comments:  2016 10:42 AM - Hien Rodas  Coronary arterial calcium score is 0  colonoscopy performed by Rivera Lechuga MD on 2015 at 64 Years.  Comments:  7/3/2015 3:17 PM - Beatriz Meneses RN  Advanced adenoma    7/3/2015 2:58 PM - Beatriz Meneses RN  Sedation: Propofol  Findings: Sigmoid diverticulosis, tubular adenoma  F/U repeat colonoscopy in 3 years.  Polysomnogram (SNOMED CT 868540134) on 2015 at 64 Years.  Comments:  2015 1:12 PM - Renu Wheat CMA  AHI of 21/hr; increasing to 49/hr during REM  colonoscopy on 9/3/2009 at 58 Years.  Comments:  2010 1:06 PM - Deyanira Slaughter  repeat   BCC (basal cell carcinoma), left leg (ICD-9-.71) performed by Gil De La Cruz MD on 10/6/2008 at 58 Years.  BCC (basal cell carcinoma), right shoulder (ICD-9-.61) performed by Gil De La Cruz MD on 10/6/2008 at 58 Years.  BCC (basal cell carcinoma),  right skull (ICD-9-.91) performed by Gil De La Cruz MD on 10/6/2008 at 58 Years.  Stress test ECG in  at 40 Years.  Tonsillectomy.   Social History:        Alcohol Assessment: Current            Current, 1-2 times per week, 2 drinks/episode average.  2 drinks/episode maximum.      Tobacco Assessment: Past            Former smoker, Oral      Substance Abuse Assessment: Denies Substance  Abuse            Never      Employment and Education Assessment            Retired      Home and Environment Assessment            Marital status: .  Spouse/Partner name: Oneyda..  3 children.      Nutrition and Health Assessment            Type of diet: Regular.      Exercise and Physical Activity Assessment: Does not exercise                     Comments:                      12/08/2016 - DelmyRosa nguyeni                     No regular exercise        Physical Examination   Vital Signs   9/13/2018 8:03 AM CDT Temperature Tympanic 98.1 DegF    Peripheral Pulse Rate 76 bpm    HR Method Electronic    Systolic Blood Pressure 125 mmHg    Diastolic Blood Pressure 82 mmHg  HI    Mean Arterial Pressure 96 mmHg    BP Method Electronic    Vital Signs Comments Thigh Cuff Used      Measurements from flowsheet : Measurements   9/13/2018 8:03 AM CDT    Weight Measured - Standard                319.2 lb     General:  Alert and oriented, No acute distress.    Eye:  Normal conjunctiva.    HENT:  Normocephalic, Tympanic membranes are clear, Oral mucosa is moist, No pharyngeal erythema.    Neck:  Supple, Non-tender, No lymphadenopathy, No thyromegaly.    Respiratory:  Breath sounds are equal, Symmetrical chest wall expansion.         Respirations: Are within normal limits.         Pattern: Regular.         Breath sounds: Bilateral, Within normal limits.    Cardiovascular:  Normal rate, Regular rhythm, No murmur, Normal peripheral perfusion, No edema.    Gastrointestinal:  Soft, Non-tender, Non-distended, Normal bowel sounds, No organomegaly.    Genitourinary:  No costovertebral angle tenderness.    Musculoskeletal:  degenerative changes noted.    Integumentary:  Warm, Dry, No rash, No qualifying data available.   .    Neurologic:  Alert, Oriented.       Health Maintenance      Recommendations     Pending (in the next year)        OverDue           CBC due  08/21/13  and every 12  month(s)           CMP due  08/21/13  and every  12  month(s)           PSA due  08/21/13  and every 12  month(s)           Tetanus Vaccine due  11/28/17  and every 10  year(s)           Body Mass Index Check (Male) due  12/08/17  and every 1  year(s)           Alcohol Misuse Screen (Male) due  12/08/17  and every 1  year(s)           Depression Screen (Male) due  12/08/17  and every 1  year(s)           Lipid Disorders Screen (Male) due  06/01/18  and every 1  year(s)           Colorectal Cancer Screen (Colonoscopy) (Male) due  06/25/18  and every 3  year(s)        Due            Abdominal Aortic Aneurysm Screen (if hx of smoking) due  09/13/18  One-time only           Fall Risk Screen (Male) due  09/13/18  and every 1  year(s)           Hepatitis C Screen [id number] (Male) due  09/13/18  One-time only           Lung Cancer Screen (Male) due  09/13/18  and every 1  year(s)        Due In Future            Type 2 Diabetes Mellitus Screen (Male) not due until  12/26/18  and every 1  year(s)           High Blood Pressure Screen (Male) not due until  01/12/19  and every 1  year(s)           Obesity Screen and Counseling (Male) not due until  01/12/19  and every 1  year(s)           Tobacco Use Screen (Male) not due until  01/12/19  and every 1  year(s)           Influenza Vaccine not due until  01/12/19  and every 1  year(s)     Satisfied (in the past 1 year)        Satisfied            High Blood Pressure Screen (Male) on  01/12/18.           Influenza Vaccine on  01/12/18.           Obesity Screen and Counseling (Male) on  01/12/18.           Tobacco Use Screen (Male) on  01/12/18.           Type 2 Diabetes Mellitus Screen (Male) on  12/26/17.          Review / Management   Results review:  Lab results   9/10/2018 8:38 AM CDT Sodium Level 138 mmol/L    Potassium Level 3.9 mmol/L    Chloride Level 99 mmol/L    CO2 Level 31 mmol/L    Glucose Level 114 mg/dL  HI    BUN 17 mg/dL    Creatinine Level 0.84 mg/dL    BUN/Creat Ratio NOT APPLICABLE    eGFR 90 mL/min/1.73m2     eGFR African American 104 mL/min/1.73m2    Calcium Level 9.7 mg/dL    Hgb A1c 5.6    Cholesterol 147 mg/dL    Non-HDL Cholesterol 96    HDL 51 mg/dL    Cholesterol/HDL Ratio 2.9    LDL 77    Triglyceride 106 mg/dL   .       Impression and Plan   Diagnosis     HTN (Hypertension) (AOD36-CM I10).     Vitamin D Deficiency (IOH42-VI E55.9).     Prediabetes (XXB40-ER R73.09).     TAWANDA (obstructive sleep apnea) (YQN39-DQ G47.33).     Obesity (BVA92-AH E66.9).     Nonalcoholic steatohepatitis (NUÑEZ) (IKV99-BQ K75.81).     Adenoma (ZUJ45-XI D36.9).     Aneurysm of thoracic aorta (YEG88-KU I71.2).     Celiac artery dissection (AJE38-VP I77.79).     Course:  Progressing as expected.    Plan:    Recheck in six months  BMI,Weight loss,exercise,diet discussed  add norvasc goal bp 120s/70s  colonoscopy  cta fu.    Patient Instructions:       Counseled: Patient, Regarding diagnosis, Regarding treatment, Regarding medications, Diet, Activity.

## 2022-02-16 NOTE — NURSING NOTE
Hearing and Vision Screening Entered On:  5/14/2019 8:12 AM CDT    Performed On:  5/14/2019 8:12 AM CDT by Sangita William               Hearing and Vision Screening   Audiogram Result Right Ear :   Fail   Audiogram Result Left Ear :   Fail   Hearing Screen Comments :   Discussed complete hearing evaluation, will discuss with TFS   Sangita William - 5/14/2019 8:12 AM CDT

## 2022-02-16 NOTE — NURSING NOTE
Comprehensive Intake Entered On:  5/14/2019 8:10 AM CDT    Performed On:  5/14/2019 8:08 AM CDT by Sangita William               Summary   Chief Complaint :   HTN check up   Advance Directive :   No   Weight Measured :   309.8 lb(Converted to: 309 lb 13 oz, 140.52 kg)    Systolic Blood Pressure :   116 mmHg   Diastolic Blood Pressure :   75 mmHg   Mean Arterial Pressure :   89 mmHg   Peripheral Pulse Rate :   68 bpm   BP Method :   Electronic   HR Method :   Electronic   Temperature Tympanic :   97.6 DegF(Converted to: 36.4 DegC)  (LOW)    Race :      Languages :   English   Ethnicity :   Not  or    Sangita William - 5/14/2019 8:08 AM CDT   Health Status   Allergies Verified? :   Yes   Medication History Verified? :   Yes   Immunizations Current :   Yes   Pre-Visit Planning Status :   Completed   Tobacco Use? :   Never smoker   Sangita William - 5/14/2019 8:08 AM CDT   Meds / Allergies   (As Of: 5/14/2019 8:10:06 AM CDT)   Allergies (Active)   Zestril  Estimated Onset Date:   Unspecified ; Reactions:   Cough ; Created By:   Lolis Huggins; Reaction Status:   Active ; Category:   Drug ; Substance:   Zestril ; Type:   Intolerance ; Updated By:   Lolis Huggins; Source:   Paper Chart ; Reviewed Date:   9/27/2018 8:57 AM CDT        Medication List   (As Of: 5/14/2019 8:10:06 AM CDT)   Prescription/Discharge Order    losartan  :   losartan ; Status:   Prescribed ; Ordered As Mnemonic:   losartan 25 mg oral tablet ; Simple Display Line:   25 mg, 1 tab(s), Oral, daily, Per Dr. Danielson 9/27/2018, 30 tab(s), 0 Refill(s) ; Ordering Provider:   Dr Neel Cm; Catalog Code:   losartan ; Order Dt/Tm:   9/27/2018 9:18:43 AM          lovastatin  :   lovastatin ; Status:   Prescribed ; Ordered As Mnemonic:   lovastatin 40 mg oral tablet ; Simple Display Line:   40 mg, 1 tab(s), Oral, daily, 90 tab(s), 1 Refill(s) ; Ordering Provider:   Otilio Moya MD; Catalog Code:   lovastatin ; Order  Dt/Tm:   9/13/2018 9:08:18 AM          omeprazole  :   omeprazole ; Status:   Prescribed ; Ordered As Mnemonic:   omeprazole 20 mg oral delayed release capsule ; Simple Display Line:   20 mg, 1 cap(s), Oral, daily, PRN: for heartburn, 90 cap(s), 1 Refill(s) ; Ordering Provider:   Otilio Moya MD; Catalog Code:   omeprazole ; Order Dt/Tm:   9/13/2018 9:08:17 AM          atenolol-chlorthalidone  :   atenolol-chlorthalidone ; Status:   Prescribed ; Ordered As Mnemonic:   atenolol-chlorthalidone 100 mg-25 mg oral tablet ; Simple Display Line:   1 tab(s), po, daily, 90 tab(s), 1 Refill(s) ; Ordering Provider:   Otilio Moya MD; Catalog Code:   atenolol-chlorthalidone ; Order Dt/Tm:   9/13/2018 9:08:16 AM          potassium chloride  :   potassium chloride ; Status:   Prescribed ; Ordered As Mnemonic:   potassium chloride 20 mEq oral tablet, extended release ; Simple Display Line:   20 mEq, 1 tab(s), po, daily, 90 tab(s), 1 Refill(s) ; Ordering Provider:   Otilio Moya MD; Catalog Code:   potassium chloride ; Order Dt/Tm:   9/13/2018 9:08:14 AM            Home Meds    multivitamin  :   multivitamin ; Status:   Documented ; Ordered As Mnemonic:   Multiple Vitamins oral tablet ; Simple Display Line:   1 tab(s), Oral, daily, 0 Refill(s) ; Catalog Code:   multivitamin ; Order Dt/Tm:   5/14/2019 8:08:36 AM          aspirin  :   aspirin ; Status:   Documented ; Ordered As Mnemonic:   aspirin 325 mg oral tablet ; Simple Display Line:   325 mg, 1 tab(s), po, daily, 0 Refill(s) ; Catalog Code:   aspirin ; Order Dt/Tm:   6/1/2016 10:09:56 AM

## 2022-02-16 NOTE — PROGRESS NOTES
Patient:   MARIAH FRANCOIS JR            MRN: [id number]            FIN: [id number]               Age:   68 years     Sex:  Male     :  1950   Associated Diagnoses:   HTN (Hypertension); Vitamin D Deficiency; Prediabetes; TAWANDA (obstructive sleep apnea); Obesity; Nonalcoholic steatohepatitis (NUÑEZ); Adenoma; Aneurysm of thoracic aorta; Celiac artery dissection   Author:   Otilio Moya MD      Visit Information      Date of Service: 2019 08:00 am  Performing Location: [facility]  Encounter#: [id number]      Primary Care Provider (PCP):  Otilio Moya MD    NPLENIN# [id number]      Referring Provider:  Otilio Moya MD# [id number]      Chief Complaint   2019 8:08 AM CDT    HTN check up        History of Present Illness             The patient presents for follow-up evaluation of hypertension.  The context of the hypertension: blood pressure was maintained within the target range.  Exacerbating factors consist of none.  Relieving factors consist of medication.  Associated symptoms consist of none.        Interval History   Cholesterol Management   Total cholesterol results < 200 mg/dL (optimal).  LDL cholesterol results < 100 mg/dL (optimal).  Associated symptoms characterized by no fatigue, chest pain, joint pain, muscle weakness or myalgias.     Some djd issues  See cards for aortic dilitation      Review of Systems   Constitutional:  Negative.    Eye:  Negative.    Ear/Nose/Mouth/Throat:  Negative.    Respiratory:  Negative.    Cardiovascular:  Negative.    Gastrointestinal:  Negative.    Genitourinary:  Negative.    Hematology/Lymphatics:  Negative.    Endocrine:  Negative.    Immunologic:  Negative.    Musculoskeletal:  Negative except as documented in history of present illness.    Integumentary:  Negative.    Neurologic:  Negative.       Health Status   Allergies:    Nonallergic Reactions (Selected)  Severity Not Documented  Zestril (Cough)   Medications:   (Selected)   Prescriptions  Prescribed  atenolol-chlorthalidone 100 mg-25 mg oral tablet: 1 tab(s), po, daily, # 90 tab(s), 1 Refill(s), Type: Maintenance, Pharmacy: [id number], 1 tab(s) Oral daily  losartan 25 mg oral tablet: = 1 tab(s) ( 25 mg ), Oral, daily, Instructions: Per Dr. Danielson 9/27/2018, # 30 tab(s), 0 Refill(s), Type: Maintenance  lovastatin 40 mg oral tablet: = 1 tab(s) ( 40 mg ), Oral, daily, # 90 tab(s), 1 Refill(s), Type: Maintenance, Pharmacy: [id number], 1 tab(s) Oral daily  omeprazole 20 mg oral delayed release capsule: = 1 cap(s) ( 20 mg ), Oral, daily, PRN: for heartburn, # 90 cap(s), 1 Refill(s), Type: Maintenance, Pharmacy: [id number]  potassium chloride 20 mEq oral tablet, extended release: = 1 tab(s) ( 20 mEq ), po, daily, # 90 tab(s), 1 Refill(s), Type: Maintenance, Pharmacy: [id number], 1 tab(s) Oral daily  Documented Medications  Documented  Multiple Vitamins oral tablet: 1 tab(s), Oral, daily, 0 Refill(s), Type: Maintenance  aspirin 325 mg oral tablet: 1 tab(s) ( 325 mg ), po, daily, 0 Refill(s), Type: Maintenance,    Medications          *denotes recorded medication          *aspirin 325 mg oral tablet: 325 mg, 1 tab(s), po, daily, 0 Refill(s).          atenolol-chlorthalidone 100 mg-25 mg oral tablet: 1 tab(s), po, daily, 90 tab(s), 1 Refill(s).          losartan 25 mg oral tablet: 25 mg, 1 tab(s), Oral, daily, Per Dr. Danielson 9/27/2018, 30 tab(s), 0 Refill(s).          lovastatin 40 mg oral tablet: 40 mg, 1 tab(s), Oral, daily, 90 tab(s), 1 Refill(s).          *Multiple Vitamins oral tablet: 1 tab(s), Oral, daily, 0 Refill(s).          omeprazole 20 mg oral delayed release capsule: 20 mg, 1 cap(s), Oral, daily, PRN: for heartburn, 90 cap(s), 1 Refill(s).          potassium chloride 20 mEq oral tablet, extended release: 20 mEq, 1 tab(s), po, daily, 90 tab(s), 1 Refill(s).     Problem list:    All Problems  Adenoma / ICD-9-.9 /  Confirmed  Aneurysm of thoracic aorta / SNOMED CT 8795134255 / Confirmed  Hemorrhoids, Internal / ICD-9-.0 / Confirmed  HTN (hypertension) / SNOMED CT 1226839383 / Confirmed  Lipids abnormal / ICD-9-.9 / Confirmed  Nonalcoholic steatohepatitis (NUÑEZ) / SNOMED CT 9956980013 / Confirmed  Obesity / SNOMED CT 1733944611 / Probable  TAWANDA (obstructive sleep apnea) / SNOMED CT 318599404 / Confirmed  ED (erectile dysfunction) / SNOMED CT 3212977267 / Confirmed  Prediabetes / SNOMED CT 539605428 / Confirmed  Incomplete RBBB / SNOMED CT 33588240 / Confirmed  Acquired polycythemia / SNOMED CT 908256554 / Confirmed  Seasonal allergies / SNOMED CT 693462007 / Confirmed  Status post skin graft / ICD-9-CM V42.3 / Confirmed  Vitamin D Deficiency / SNOMED CT 30664448 / Confirmed  Resolved: History of chicken pox / SNOMED CT 582870484      Histories   Past Medical History:    Active  TAWANDA (obstructive sleep apnea) (739385486): Onset on 2015 at 64 years.  Status post skin graft (V42.3): Onset on 2012 at 62 years.  Adenoma (229.9): Onset in the month of 2009 at 58 years  Hemorrhoids, Internal (455.0): Onset in  at 58 years.  HTN (hypertension) (2088491684)  ED (erectile dysfunction) (0043600289)  Obesity (7500356993)  Seasonal allergies (954926610)  Resolved  History of chicken pox (691960263):  Resolved.   Family History:    Dementia  Mother (unknown, )  Leukemia  Father ()  Pacemaker  Mother (unknown, )     Procedure history:    Colonoscopy (SNOMED CT 237099228) performed by Stan Wall MD on 10/4/2018 at 68 Years.  Comments:  10/11/2018 8:28 AM CDT - Mrau Ramirez  Indication:  History of polyps.  Sedation:  MAC.  Impression:  One 5 m polyp in rectum.  Diverticulosis in sigmoid colon.  Cardiac computed tomography for calcium scoring (SNOMED CT 9730010504) on 2016 at 65 Years.  Comments:  2016 10:42 AM CDT - Hien Rodas  Coronary arterial calcium score is  0  colonoscopy performed by Rivera Lechuga MD on 6/24/2015 at 64 Years.  Comments:  7/3/2015 3:17 PM CDT - Beatriz Meneses RN  Advanced adenoma    7/3/2015 2:58 PM CDT - Beatriz Meneses RN  Sedation: Propofol  Findings: Sigmoid diverticulosis, tubular adenoma  F/U repeat colonoscopy in 3 years.  Polysomnogram (SNOMED CT 342260924) on 5/13/2015 at 64 Years.  Comments:  6/17/2015 1:12 PM CDT - Renu Wheat CMA  AHI of 21/hr; increasing to 49/hr during REM  colonoscopy on 9/3/2009 at 58 Years.  Comments:  6/18/2010 1:06 PM CDT - Deyanira Slaughter  repeat 2012  BCC (basal cell carcinoma), left leg (ICD-9-.71) performed by Gil De La Cruz MD on 10/6/2008 at 58 Years.  BCC (basal cell carcinoma), right shoulder (ICD-9-.61) performed by Gil De La Cruz MD on 10/6/2008 at 58 Years.  BCC (basal cell carcinoma),  right skull (ICD-9-.91) performed by Gil De La Cruz MD on 10/6/2008 at 58 Years.  Stress test ECG in 1990 at 40 Years.  Tonsillectomy.   Social History:        Alcohol Assessment: Current            Current, 1-2 times per week, 2 drinks/episode average.  2 drinks/episode maximum.      Tobacco Assessment: Past            Former smoker, Oral      Substance Abuse Assessment: Denies Substance Abuse            Never      Employment and Education Assessment            Retired      Home and Environment Assessment            Marital status: .  Spouse/Partner name: Oneyda..  3 children.      Nutrition and Health Assessment            Type of diet: Regular.      Exercise and Physical Activity Assessment: Does not exercise                     Comments:                      12/08/2016 - Hien Rodas                     No regular exercise      Physical Examination   Vital Signs   5/14/2019 8:08 AM CDT Temperature Tympanic 97.6 DegF  LOW    Peripheral Pulse Rate 68 bpm    HR Method Electronic    Systolic Blood Pressure 116 mmHg    Diastolic Blood Pressure 75 mmHg    Mean Arterial Pressure 89  mmHg    BP Method Electronic      Measurements from flowsheet : Measurements   5/14/2019 8:08 AM CDT    Weight Measured - Standard                309.8 lb     General:  Alert and oriented, No acute distress.    Eye:  Normal conjunctiva.    HENT:  Normocephalic, Tympanic membranes are clear, Oral mucosa is moist, No pharyngeal erythema.    Neck:  Supple, Non-tender, No lymphadenopathy, No thyromegaly.    Respiratory:  Breath sounds are equal, Symmetrical chest wall expansion.         Respirations: Are within normal limits.         Pattern: Regular.         Breath sounds: Bilateral, Within normal limits.    Cardiovascular:  Normal rate, Regular rhythm, No murmur, Normal peripheral perfusion, No edema.    Gastrointestinal:  Soft, Non-tender, Non-distended, Normal bowel sounds, No organomegaly.    Genitourinary:  No costovertebral angle tenderness.    Musculoskeletal:  degenerative changes noted.    Integumentary:  Warm, Dry, No rash, No qualifying data available.   .    Neurologic:  Alert, Oriented.       Health Maintenance      Recommendations     Pending (in the next year)        OverDue           CBC due  08/21/13  and every 12  month(s)           CMP due  08/21/13  and every 12  month(s)           PSA due  08/21/13  and every 12  month(s)           Tetanus Vaccine due  11/28/17  and every 10  year(s)           Alcohol Misuse Screen (Male) due  12/08/17  and every 1  year(s)           Depression Screen (Male) due  12/08/17  and every 1  year(s)        Due            Abdominal Aortic Aneurysm Screen (if hx of smoking) due  05/14/19  One-time only           Fall Risk Screen (Male) due  05/14/19  and every 1  year(s)           Hepatitis C Screen [id number] (Male) due  05/14/19  One-time only           Lung Cancer Screen (Male) due  05/14/19  and every 1  year(s)        Due In Future            Influenza Vaccine not due until  09/01/19  and every 1  year(s)           Lipid Disorders Screen (Male) not due until  09/10/19   and every 1  year(s)           Body Mass Index Check (Male) not due until  09/27/19  and every 1  year(s)           Type 2 Diabetes Mellitus Screen (Male) not due until  05/07/20  and every 1  year(s)     Satisfied (in the past 1 year)        Satisfied            Body Mass Index Check (Male) on  09/27/18.           Colorectal Cancer Screen (Colonoscopy) (Male) on  10/04/18.           Colorectal Cancer Screen (Colonoscopy) (Male) on  10/04/18.           Colorectal Cancer Screen (Occult Blood) (Male) on  10/04/18.           Colorectal Cancer Screen (Occult Blood) (Male) on  10/04/18.           Colorectal Cancer Screen (Sigmoidoscopy) (Male) on  10/04/18.           High Blood Pressure Screen (Male) on  05/14/19.           High Blood Pressure Screen (Male) on  09/27/18.           High Blood Pressure Screen (Male) on  09/13/18.           Influenza Vaccine on  09/13/18.           Lipid Disorders Screen (Male) on  09/10/18.           Lipid Disorders Screen (Male) on  09/10/18.           Lipid Disorders Screen (Male) on  09/10/18.           Lipid Disorders Screen (Male) on  09/10/18.           Obesity Screen and Counseling (Male) on  05/14/19.           Obesity Screen and Counseling (Male) on  09/27/18.           Obesity Screen and Counseling (Male) on  09/13/18.           Tobacco Use Screen (Male) on  05/14/19.           Tobacco Use Screen (Male) on  09/27/18.           Tobacco Use Screen (Male) on  09/13/18.           Type 2 Diabetes Mellitus Screen (Male) on  05/07/19.           Type 2 Diabetes Mellitus Screen (Male) on  09/10/18.        Review / Management   Results review:  Lab results   5/7/2019 10:00 AM CDT Hgb A1c 5.7  HI    Vitamin D 25 OH 29 ng/mL  LOW    PSA 0.7 ng/mL   .       Impression and Plan   Diagnosis     HTN (Hypertension) (VZU38-VU I10).     Vitamin D Deficiency (PCB84-VD E55.9).     Prediabetes (PPM82-HF R73.09).     TAWANDA (obstructive sleep apnea) (TOZ68-QZ G47.33).     Obesity (GZO79-DM E66.9).      Nonalcoholic steatohepatitis (NUÑEZ) (CZH60-MB K75.81).     Adenoma (DSW47-YG D36.9).     Aneurysm of thoracic aorta (SAU02-EC I71.2).     Celiac artery dissection (AZD69-WH I77.79).     Course:  Progressing as expected.    Plan:    Recheck in six months  BMI,Weight loss,exercise,diet discussed  , resume vit d.    Patient Instructions:       Counseled: Patient, Regarding diagnosis, Regarding treatment, Regarding medications, Diet, Activity.

## 2022-02-16 NOTE — PROGRESS NOTES
Patient:   MARIAH FRANCOIS JR            MRN: [id number]            FIN: [id number]               Age:   67 years     Sex:  Male     :  1950   Associated Diagnoses:   Lipids abnormal; HTN (Hypertension); Vitamin D Deficiency; Prediabetes; TAWANDA (obstructive sleep apnea); Obesity; Nonalcoholic steatohepatitis (NUÑEZ)   Author:   Otilio Moya MD      Visit Information      Date of Service: 2018 08:02 am  Performing Location: [facility]  Encounter#: [id number]      Primary Care Provider (PCP):  Otilio Moya MD    NPLENIN# [id number]      Referring Provider:  Otilio Moya MD# [id number]      Chief Complaint   2018 8:05 AM CST    HTN check up.        History of Present Illness             The patient presents for follow-up evaluation of hypertension.  The context of the hypertension: blood pressure was maintained within the target range.  Exacerbating factors consist of none.  Relieving factors consist of medication.  Associated symptoms consist of none.        Interval History   Cholesterol Management   Total cholesterol results < 200 mg/dL (optimal).  LDL cholesterol results < 100 mg/dL (optimal).  Associated symptoms characterized by no fatigue, chest pain, joint pain, muscle weakness or myalgias.        Review of Systems   Constitutional:  Negative.    Eye:  Negative.    Ear/Nose/Mouth/Throat:  Negative.    Respiratory:  Negative.    Cardiovascular:  Negative.    Gastrointestinal:  Negative.    Genitourinary:  Negative.    Hematology/Lymphatics:  Negative.    Endocrine:  Negative.    Immunologic:  Negative.    Musculoskeletal:  Negative.    Integumentary:  Negative.    Neurologic:  Negative.       Health Status   Allergies:    Nonallergic Reactions (Selected)  Severity Not Documented  Zestril (Cough)   Medications:  (Selected)   Prescriptions  Prescribed  PriLOSEC 20 mg oral delayed release capsule: 1 cap(s) ( 20 mg ), po, daily, # 90 cap(s), 1 Refill(s), Type:  Maintenance, Pharmacy: [id number], 1 cap(s) po daily  atenolol-chlorthalidone 100 mg-25 mg oral tablet: 1 tab(s), po, daily, # 90 tab(s), 1 Refill(s), Type: Maintenance, Pharmacy: [id number], 1 tab(s) po daily  lovastatin 40 mg oral tablet: 1 tab(s) ( 40 mg ), po, daily, # 90 tab(s), 1 Refill(s), Type: Maintenance, Pharmacy: [id number], 1 tab(s) po daily  potassium chloride 20 mEq oral tablet, extended release: 1 tab(s) ( 20 mEq ), po, daily, # 90 tab(s), 1 Refill(s), Type: Maintenance, Pharmacy: [id number], 1 tab(s) po daily,x90 day(s)  Documented Medications  Documented  aspirin 325 mg oral tablet: 1 tab(s) ( 325 mg ), po, daily, 0 Refill(s), Type: Maintenance   Problem list:    All Problems  Adenoma / ICD-9-.9 / Confirmed  Aneurysm of thoracic aorta / SNOMED CT 2097309719 / Confirmed  Hemorrhoids, Internal / ICD-9-.0 / Confirmed  HTN (hypertension) / SNOMED CT 0349878799 / Confirmed  Lipids abnormal / ICD-9-.9 / Confirmed  Nonalcoholic steatohepatitis (NUÑEZ) / SNOMED CT 1730646700 / Confirmed  Obesity / SNOMED CT 9688114545 / Probable  TAWANDA (obstructive sleep apnea) / SNOMED CT 445279915 / Confirmed  ED (erectile dysfunction) / SNOMED CT 8863355307 / Confirmed  Prediabetes / SNOMED CT 461957718 / Confirmed  Incomplete RBBB / SNOMED CT 40379330 / Confirmed  Acquired polycythemia / SNOMED CT 149420733 / Confirmed  Seasonal allergies / SNOMED CT 824779733 / Confirmed  Status post skin graft / ICD-9-CM V42.3 / Confirmed  Vitamin D Deficiency / SNOMED CT 10855735 / Confirmed  Resolved: History of chicken pox / SNOMED CT 353709859      Histories   Past Medical History:    Active  TAWANDA (obstructive sleep apnea) (072440712): Onset on 5/13/2015 at 64 years.  Status post skin graft (V42.3): Onset on 11/23/2012 at 62 years.  Adenoma (229.9): Onset in the month of 9/2009 at 58 years  Hemorrhoids, Internal (455.0): Onset in 2009 at 58 years.  HTN (hypertension)  (6825379883)  ED (erectile dysfunction) (5032375474)  Obesity (6195884179)  Seasonal allergies (768468775)  Resolved  History of chicken pox (640652359):  Resolved.   Family History:    Dementia  Mother (unknown, )  Leukemia  Father ()  Pacemaker  Mother (unknown, )     Procedure history:    Cardiac computed tomography for calcium scoring (SNOMED CT 7773818929) on 2016 at 65 Years.  Comments:  2016 10:42 AM - Hien Rodas  Coronary arterial calcium score is 0  colonoscopy performed by Rivera Lechuga MD on 2015 at 64 Years.  Comments:  7/3/2015 3:17 PM - Beatriz Meneses RN  Advanced adenoma    7/3/2015 2:58 PM - Beatriz Meneses RN  Sedation: Propofol  Findings: Sigmoid diverticulosis, tubular adenoma  F/U repeat colonoscopy in 3 years.  Polysomnogram (SNOMED CT 830477006) on 2015 at 64 Years.  Comments:  2015 1:12 PM - Renu Wheat CMA  AHI of 21/hr; increasing to 49/hr during REM  colonoscopy on 9/3/2009 at 58 Years.  Comments:  2010 1:06 PM - Deyanira Slaughter  repeat 2012  BCC (basal cell carcinoma), left leg (ICD-9-.71) performed by Gil De La Cruz MD on 10/6/2008 at 58 Years.  BCC (basal cell carcinoma), right shoulder (ICD-9-.61) performed by Gil De La Cruz MD on 10/6/2008 at 58 Years.  BCC (basal cell carcinoma),  right skull (ICD-9-.91) performed by Gil De La Cruz MD on 10/6/2008 at 58 Years.  Stress test ECG in  at 40 Years.  Tonsillectomy.   Social History:        Alcohol Assessment: Current            Current, 1-2 times per week, 2 drinks/episode average.  2 drinks/episode maximum.      Tobacco Assessment: Past            Former smoker, Oral      Substance Abuse Assessment: Denies Substance Abuse            Never      Employment and Education Assessment            Retired      Home and Environment Assessment            Marital status: .  Spouse/Partner name: Oneyda..  3 children.      Nutrition and Health  Assessment            Type of diet: Regular.      Exercise and Physical Activity Assessment: Does not exercise                     Comments:                      12/08/2016 - Hien Rodas                     No regular exercise        Physical Examination   Vital Signs   1/12/2018 8:05 AM CST Temperature Tympanic 97.2 DegF  LOW    Peripheral Pulse Rate 62 bpm    Systolic Blood Pressure 137 mmHg  HI    Diastolic Blood Pressure 84 mmHg  HI    Mean Arterial Pressure 102 mmHg    BP Method Electronic      Measurements from flowsheet : Measurements   1/12/2018 8:05 AM CST    Weight Measured - Standard                307.4 lb     General:  Alert and oriented, No acute distress.    Eye:  Normal conjunctiva.    HENT:  Normocephalic, Tympanic membranes are clear, Oral mucosa is moist, No pharyngeal erythema.    Neck:  Supple, Non-tender, No lymphadenopathy, No thyromegaly.    Respiratory:  Breath sounds are equal, Symmetrical chest wall expansion.         Respirations: Are within normal limits.         Pattern: Regular.         Breath sounds: Bilateral, Within normal limits.    Cardiovascular:  Normal rate, Regular rhythm, No murmur, Normal peripheral perfusion, No edema.    Gastrointestinal:  Soft, Non-tender, Non-distended, Normal bowel sounds, No organomegaly.    Genitourinary:  No costovertebral angle tenderness.    Musculoskeletal:  degenerative changes noted.    Integumentary:  Warm, Dry, No rash, No qualifying data available.   .    Neurologic:  Alert, Oriented.       Health Maintenance      Recommendations     Pending (in the next year)        OverDue           CBC due  08/21/13  and every 12  month(s)           CMP due  08/21/13  and every 12  month(s)           PSA due  08/21/13  and every 12  month(s)           Tetanus Vaccine due  11/28/17  and every 10  year(s)           Body Mass Index Check (Male) due  12/08/17  and every 1  year(s)           Alcohol Misuse Screen (Male) due  12/08/17  and every 1   year(s)           Depression Screen (Male) due  12/08/17  and every 1  year(s)        Due            Abdominal Aortic Aneurysm Screen (if hx of smoking) due  01/12/18  One-time only           Fall Risk Screen (Male) due  01/12/18  and every 1  year(s)           Lung Cancer Screen (Male) due  01/12/18  and every 1  year(s)        Due In Future            Lipid Disorders Screen (Male) not due until  06/01/18  and every 1  year(s)           Colorectal Cancer Screen (Colonoscopy) (Male) not due until  06/25/18  and every 3  year(s)           Type 2 Diabetes Mellitus Screen (Male) not due until  12/26/18  and every 1  year(s)     Satisfied (in the past 1 year)        Satisfied            High Blood Pressure Screen (Male) on  01/12/18.           High Blood Pressure Screen (Male) on  06/14/17.           Influenza Vaccine on  01/12/18.           Lipid Disorders Screen (Male) on  06/01/17.           Lipid Disorders Screen (Male) on  06/01/17.           Lipid Disorders Screen (Male) on  06/01/17.           Lipid Disorders Screen (Male) on  06/01/17.           Obesity Screen and Counseling (Male) on  01/12/18.           Obesity Screen and Counseling (Male) on  06/14/17.           Tobacco Use Screen (Male) on  01/12/18.           Tobacco Use Screen (Male) on  06/14/17.           Type 2 Diabetes Mellitus Screen (Male) on  12/26/17.           Type 2 Diabetes Mellitus Screen (Male) on  06/01/17.          Review / Management   Results review:  Lab results: 12/26/2017 3:00 PM CST   Hgb A1c                   5.6  .       Impression and Plan   Diagnosis     Lipids abnormal (BED48-OW E78.89).     Course:  Progressing as expected.    Plan:  BMI,Weight loss,exercise,diet discussed.    Patient Instructions:       Counseled: Patient, Regarding diagnosis, Regarding treatment, Regarding medications.    Diagnosis     HTN (Hypertension) (NYU96-JN I10).     Vitamin D Deficiency (WOW97-XE E55.9).     Prediabetes (NID00-MJ R73.09).     TAWANDA  (obstructive sleep apnea) (JJH04-QC G47.33).     Obesity (UMX46-WP E66.9).     Nonalcoholic steatohepatitis (NUÑEZ) (JLW05-KI K75.81).     Course:  Progressing as expected, discussed fungal nail infection consider podiatry.    Plan:    Recheck in six months  BMI,Weight loss,exercise,diet discussed  add kcl.

## 2022-02-16 NOTE — NURSING NOTE
Comprehensive Intake Entered On:  12/11/2019 8:50 AM CST    Performed On:  12/11/2019 8:49 AM CST by Mayelin Bajwa               Summary   Chief Complaint :   Pt here today for med check and lab f/up    Advance Directive :   No   Weight Measured :   319.8 lb(Converted to: 319 lb 13 oz, 145.06 kg)    Height Measured :   71 in(Converted to: 5 ft 11 in, 180.34 cm)    Body Mass Index :   44.6 kg/m2 (HI)    Body Surface Area :   2.69 m2   Race :      Languages :   English   Ethnicity :   Not  or    Mayelin Bajwa - 12/11/2019 8:49 AM CST   Health Status   Allergies Verified? :   Yes   Medication History Verified? :   Yes   Immunizations Current :   Yes   Medical History Verified? :   Yes   Pre-Visit Planning Status :   Completed   Mayelin Bajwa - 12/11/2019 8:49 AM CST   Consents   Consent for Immunization Exchange :   Consent Granted   Consent for Immunizations to Providers :   Consent Granted   Mayelin Bajwa - 12/11/2019 8:49 AM CST   Meds / Allergies   (As Of: 12/11/2019 8:50:50 AM CST)   Allergies (Active)   Zestril  Estimated Onset Date:   Unspecified ; Reactions:   Cough ; Created By:   Lolis Huggins; Reaction Status:   Active ; Category:   Drug ; Substance:   Zestril ; Type:   Intolerance ; Updated By:   Lolis Huggins; Source:   Paper Chart ; Reviewed Date:   12/11/2019 8:50 AM CST        Medication List   (As Of: 12/11/2019 8:50:50 AM CST)   Prescription/Discharge Order    lovastatin  :   lovastatin ; Status:   Prescribed ; Ordered As Mnemonic:   lovastatin 40 mg oral tablet ; Simple Display Line:   40 mg, 1 tab(s), Oral, daily, 90 tab(s), 1 Refill(s) ; Ordering Provider:   Otilio Moya MD; Catalog Code:   lovastatin ; Order Dt/Tm:   5/14/2019 10:35:38 AM CDT          losartan  :   losartan ; Status:   Prescribed ; Ordered As Mnemonic:   losartan 25 mg oral tablet ; Simple Display Line:   25 mg, 1 tab(s), Oral, daily, 90 tab(s), 1  Refill(s) ; Ordering Provider:   Otilio Moya MD; Catalog Code:   losartan ; Order Dt/Tm:   5/14/2019 10:35:37 AM CDT          omeprazole  :   omeprazole ; Status:   Prescribed ; Ordered As Mnemonic:   omeprazole 20 mg oral delayed release capsule ; Simple Display Line:   20 mg, 1 cap(s), Oral, daily, PRN: for heartburn, 90 cap(s), 1 Refill(s) ; Ordering Provider:   Otilio Moya MD; Catalog Code:   omeprazole ; Order Dt/Tm:   5/14/2019 10:35:36 AM CDT          potassium chloride  :   potassium chloride ; Status:   Prescribed ; Ordered As Mnemonic:   potassium chloride 20 mEq oral tablet, extended release ; Simple Display Line:   20 mEq, 1 tab(s), po, daily, 90 tab(s), 1 Refill(s) ; Ordering Provider:   Otilio Moya MD; Catalog Code:   potassium chloride ; Order Dt/Tm:   5/14/2019 10:35:36 AM CDT          atenolol-chlorthalidone  :   atenolol-chlorthalidone ; Status:   Prescribed ; Ordered As Mnemonic:   atenolol-chlorthalidone 100 mg-25 mg oral tablet ; Simple Display Line:   1 tab(s), po, daily, 90 tab(s), 1 Refill(s) ; Ordering Provider:   Otilio Moya MD; Catalog Code:   atenolol-chlorthalidone ; Order Dt/Tm:   5/14/2019 10:35:35 AM CDT            Home Meds    multivitamin  :   multivitamin ; Status:   Documented ; Ordered As Mnemonic:   Multiple Vitamins oral tablet ; Simple Display Line:   1 tab(s), Oral, daily, 0 Refill(s) ; Catalog Code:   multivitamin ; Order Dt/Tm:   5/14/2019 8:08:36 AM CDT          aspirin  :   aspirin ; Status:   Documented ; Ordered As Mnemonic:   aspirin 325 mg oral tablet ; Simple Display Line:   325 mg, 1 tab(s), po, daily, 0 Refill(s) ; Catalog Code:   aspirin ; Order Dt/Tm:   6/1/2016 10:09:56 AM CDT

## 2022-02-16 NOTE — PROGRESS NOTES
Patient:   MARIAH FRANCOIS JR            MRN: [id number]            FIN: [id number]               Age:   70 years     Sex:  Male     :  1950   Associated Diagnoses:   Annual exam; Fatigue; Lipids abnormal; Prediabetes; HTN (Hypertension)   Author:   Otilio Moya MD      Visit Information      Date of Service: 2021 09:08 am  Performing Location: [facility]  Encounter#: [id number]      Primary Care Provider (PCP):  Otilio Moya MD# [id number]      Referring Provider:  Otilio Moya MD# [id number]      Chief Complaint   2021 9:18 AM CDT    AWV   Medicare Initial / Annual Preventative Visit      Well Adult History   Well Adult History             The patient presents for well adult exam.  ADL's and Safety were reviewed.  _None_____ found.  Please see copy of scanned form.    I have reviewed with the patient the completed health risk assessment and health history form and we have agreed on the following plans for identified risk factors. ___ none____ found.  Please see copy of scanned form.    Overall patient is doing well continues to live independently.  No falls some issues with arthritis in his back taking medications as directed needs to get advanced directives    .        Review of Systems   Constitutional:  Negative except as documented in history of present illness.    Eye:  See Preventative Services Checklist for Vision/Glaucoma Test dates..    Ear/Nose/Mouth/Throat:  Hearing is addressed and no impairment noted..    Respiratory:  Negative.    Cardiovascular:  Negative except as documented in history of present illness.    Gastrointestinal:  Negative except as documented in history of present illness.    Genitourinary:  Negative.    Musculoskeletal:  Negative except as documented in history of present illness.    Integumentary:  Negative.    Neurologic:  Negative except as documented in history of present illness.    Psychiatric:  PHQ-9 Noted.     See completed Health History for Review of Systems.      Health Status   Allergies:    Nonallergic Reactions (Selected)  Severity Not Documented  Zestril (Cough)   Medications:  (Selected)   Prescriptions  Prescribed  atenolol-chlorthalidone 100 mg-25 mg oral tablet: 1 tab(s), Oral, daily, # 30 tab(s), 0 Refill(s), Type: Maintenance, Pharmacy: [facility] #[id number], 1 tab(s) Oral daily, 71, in, 06/24/20 12:44:00 CDT, Height Measured, 326, lb, 01/05/21 10:38:00 CST, Weight Measured  losartan 25 mg oral tablet: = 1 tab(s), Oral, daily, # 30 tab(s), 0 Refill(s), Type: Maintenance, Pharmacy: [facility] #[id number], 1 tab(s) Oral daily, 71, in, 06/24/20 12:44:00 CDT, Height Measured, 326, lb, 01/05/21 10:38:00 CST, Weight Measured  lovastatin 40 mg oral tablet: = 1 tab(s), Oral, daily, # 30 tab(s), 0 Refill(s), Type: Maintenance, Pharmacy: [facility] #[id number], 1 tab(s) Oral daily, 71, in, 06/24/20 12:44:00 CDT, Height Measured, 326, lb, 01/05/21 10:38:00 CST, Weight Measured  potassium chloride 20 mEq oral tablet, extended release: = 1 tab(s) ( 20 mEq ), po, daily, # 90 tab(s), 1 Refill(s), Type: Maintenance, Pharmacy: [facility] #[id number], 1 tab(s) Oral daily, 71, in, 06/24/20 12:44:00 CDT, Height Measured, 326, lb, 01/05/21 10:38:00 CST, Weight Measured  Documented Medications  Documented  Multiple Vitamins oral tablet: 1 tab(s), Oral, daily, 0 Refill(s), Type: Maintenance  aspirin 325 mg oral tablet: 1 tab(s) ( 325 mg ), po, daily, 0 Refill(s), Type: Maintenance,    Medications          *denotes recorded medication          *aspirin 325 mg oral tablet: 325 mg, 1 tab(s), po, daily, 0 Refill(s).          atenolol-chlorthalidone 100 mg-25 mg oral tablet: 1 tab(s), Oral, daily, 30 tab(s), 0 Refill(s).          losartan 25 mg oral tablet: 1 tab(s), Oral, daily, 30 tab(s), 0 Refill(s).          lovastatin 40 mg oral tablet: 1 tab(s), Oral, daily, 30 tab(s), 0 Refill(s).          *Multiple  Vitamins oral tablet: 1 tab(s), Oral, daily, 0 Refill(s).          potassium chloride 20 mEq oral tablet, extended release: 20 mEq, 1 tab(s), po, daily, 90 tab(s), 1 Refill(s).       Problem list:    All Problems (Selected)  Status post skin graft / ICD-9-CM V42.3 / Confirmed  Incomplete RBBB / SNOMED CT 50213090 / Confirmed  Vitamin D Deficiency / SNOMED CT 06816231 / Confirmed  Prediabetes / SNOMED CT 414768103 / Confirmed  Seasonal allergies / SNOMED CT 824141835 / Confirmed  Hemorrhoids, Internal / ICD-9-.0 / Confirmed  ED (erectile dysfunction) / SNOMED CT 7327707512 / Confirmed  Nonalcoholic steatohepatitis (NUÑEZ) / SNOMED CT 4178294188 / Confirmed  Aneurysm of thoracic aorta / SNOMED CT 3347751327 / Confirmed  Lipids abnormal / ICD-9-.9 / Confirmed  Obesity / SNOMED CT 5508232168 / Probable  Adenoma / ICD-9-.9 / Confirmed  Acquired polycythemia / SNOMED CT 415482558 / Confirmed  TAWANDA (obstructive sleep apnea) / SNOMED CT 039188114 / Confirmed  HTN (hypertension) / SNOMED CT 5666461037 / Confirmed      Histories   Past Medical History:    Active  TAWANDA (obstructive sleep apnea) (753546314): Onset on 2015 at 64 years.  Status post skin graft (V42.3): Onset on 2012 at 62 years.  Adenoma (229.9): Onset in the month of 2009 at 58 years  Hemorrhoids, Internal (455.0): Onset in  at 58 years.  HTN (hypertension) (3878224042)  ED (erectile dysfunction) (0897980517)  Obesity (1825487744)  Seasonal allergies (830085351)  Resolved  History of chicken pox (165752922):  Resolved.   Family History:    Dementia  Mother (unknown, )  Leukemia  Father ()  Pacemaker  Mother (unknown, )     Procedure history:    Colonoscopy (SNOMED CT 035003651) performed by Stan Wall MD on 10/4/2018 at 68 Years.  Comments:  10/11/2018 8:28 AM CDT - Maru Ramirez  Indication:  History of polyps.  Sedation:  MAC.  Impression:  One 5 m polyp in rectum.  Diverticulosis in sigmoid  colon.  Cardiac computed tomography for calcium scoring (SNOMED CT 9426512642) on 6/20/2016 at 65 Years.  Comments:  6/23/2016 10:42 AM CDT - Hien Rodas  Coronary arterial calcium score is 0  colonoscopy performed by Rivera Lechuga MD on 6/24/2015 at 64 Years.  Comments:  7/3/2015 3:17 PM CDT - Beatriz Meneses RN  Advanced adenoma    7/3/2015 2:58 PM CDT - Beatriz Meneses RN  Sedation: Propofol  Findings: Sigmoid diverticulosis, tubular adenoma  F/U repeat colonoscopy in 3 years.  Polysomnogram (SNOMED CT 295470641) on 5/13/2015 at 64 Years.  Comments:  6/17/2015 1:12 PM CDT - Renu Wheat CMA  AHI of 21/hr; increasing to 49/hr during REM  colonoscopy on 9/3/2009 at 58 Years.  Comments:  6/18/2010 1:06 PM CDT - Deyanira Slaughter  repeat 2012  BCC (basal cell carcinoma), left leg (ICD-9-.71) performed by Gil De La Cruz MD on 10/6/2008 at 58 Years.  BCC (basal cell carcinoma), right shoulder (ICD-9-.61) performed by Gil De La Cruz MD on 10/6/2008 at 58 Years.  BCC (basal cell carcinoma),  right skull (ICD-9-.91) performed by Gil De La Cruz MD on 10/6/2008 at 58 Years.  Stress test ECG in 1990 at 40 Years.  Tonsillectomy.   Social History:        Electronic Cigarette/Vaping Assessment            Electronic Cigarette Use: Never.      Alcohol Assessment: Current            Current, 1-2 times per week, 2 drinks/episode average.  2 drinks/episode maximum.      Tobacco Assessment: Past            Former smoker, quit more than 30 days ago, Snuff      Substance Abuse Assessment: Denies Substance Abuse            Never      Employment and Education Assessment            Retired      Home and Environment Assessment            Marital status: .  Spouse/Partner name: Oneyda..  3 children.      Nutrition and Health Assessment            Type of diet: Regular.      Exercise and Physical Activity Assessment: Does not exercise                     Comments:                      12/08/2016 -  Hien Rodas                     No regular exercise        Physical Examination   Exam at Provider Discretion   Vital Signs   7/27/2021 9:18 AM CDT Temperature Tympanic 97.9 DegF    Peripheral Pulse Rate 81 bpm    HR Method Electronic    Systolic Blood Pressure 123 mmHg    Diastolic Blood Pressure 87 mmHg  HI    Mean Arterial Pressure 99 mmHg    BP Method Electronic    Vital Signs Comments thigh cuff used      Measurements from flowsheet : Measurements   7/27/2021 9:18 AM CDT Height Measured - Standard 71 in    Weight Measured - Standard 321.2 lb    BSA 2.7 m2    Body Mass Index 44.79 kg/m2  HI      General:  Alert and oriented, No acute distress.    Eye:  Normal conjunctiva.    HENT:  Normocephalic, Tympanic membranes are clear, Oral mucosa is moist, No pharyngeal erythema.    Neck:  Supple, Non-tender, No lymphadenopathy, No thyromegaly.    Respiratory:  Breath sounds are equal, Symmetrical chest wall expansion.         Respirations: Are within normal limits.         Pattern: Regular.         Breath sounds: Bilateral, Within normal limits.    Cardiovascular:  Normal rate, Regular rhythm, No murmur, Normal peripheral perfusion, No edema.    Gastrointestinal:  Soft, Non-tender, Non-distended, Normal bowel sounds, No organomegaly.    Genitourinary:  No costovertebral angle tenderness.    Lymphatics:  No lymphadenopathy neck, axilla, groin.    Musculoskeletal:  degenerative changes noted.    Integumentary:  Warm, Dry, No rash.    Neurologic:  Alert, Oriented, No focal deficits, Cranial Nerves II-XII are grossly intact, No signs of cognitive impairment..       Review / Management   Results review:  INCLUDE PHQ 9 0.       Impression and Plan   Diagnosis     Annual exam (FPE07-VO Z00.00).     Fatigue (NPS79-TJ G93.3).     Lipids abnormal (DMW00-HG E78.89).     Prediabetes (GAJ69-FO R73.03).     HTN (Hypertension) (VXR27-FR I10).     Medicare Initial / Annual Wellness Visit.     Course:  Not progressing as expected.     Plan:  1.  Hypertension under reasonable control with atenolol chlorthalidone and losartan     2.  Hyperlipidemia on lovastatin      No. 3 chronic reflux on omeprazole     ???????#4 obesity unchanged     5.  Prediabetes A1c's are below 6     6.  History of adenomatous colon polyps due for colonoscopy in 2023     7.  Polycythemia with negative follow-ups     8.  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  History of nonalcoholic steatohepatitis chronic and stable     10.  Sleep apnea does not use CPAP  .    Patient Instructions:       Counseled: Patient, Discussed preventative services including  Screening for AAA (Family history or male 65-70 who has smoked more than 100 cigarettes in a lifetime.), Lipid screening, Diabetes screening, Nutrition, Bone mass, Cancer screening (cervical, breast, colon), Immunizations (flu, Pneumovax, Hep. B, Zostavax), Glaucoma screening and ECG if needed.  Appropriate weight and diet discussed.  Discussed Advance Life Directives.    Preventative services checklist reviewed, updated and copy given to patient.  Please see scanned document.       .

## 2022-02-16 NOTE — LETTER
(Inserted Image. Unable to display)   January 06, 2021      MARIAH FRANCOIS      [address] [place], [place] [id number]        Dear MARIAH,    Thank you for selecting [facility] for your healthcare needs.  Below you will find the results of the recent tests done at our clinic.     All labs acceptable.      Result Name Current Result Previous Result Reference Range   Potassium Level (mmol/L)  4.3 1/5/2021  4.6 12/4/2019 3.5 - 5.3   Glucose Level (mg/dL) ((H)) 109 1/5/2021 ((H)) 109 12/4/2019 65 - 99   Creatinine Level (mg/dL)  0.88 1/5/2021  0.90 12/4/2019 0.70 - 1.18   Hgb A1c  5.6 1/5/2021 ((H)) 5.7 5/7/2019  - <5.7   Cholesterol (mg/dL)  154 1/5/2021  134 12/4/2019  - <200   HDL (mg/dL)  54 1/5/2021  49 12/4/2019 > OR = 40 -    LDL  77 1/5/2021  66 12/4/2019    Triglyceride (mg/dL)  133 1/5/2021  109 12/4/2019  - <150       Please contact me or my assistant at [phone] if you have any questions.     Sincerely,        Otilio Moya MD        What do your labs mean?  Below is a glossary of commonly ordered labs:  LDL   Bad Cholesterol   HDL   Good Cholesterol  AST/ALT   Liver Function   Cr/Creatinine   Kidney Function  Microalbumin   Kidney Function  BUN   Kidney Function  PSA   Prostate    TSH   Thyroid Hormone  HgbA1c   Diabetes Test   Hgb (Hemoglobin)   Red Blood Cells

## 2022-02-16 NOTE — LETTER
(Inserted Image. Unable to display)   December 05, 2019      MARIAH FRANCOIS      [address], [place] [id number]        Dear MARIAH,    Thank you for selecting [facility] for your healthcare needs.  Below you will find the results of the recent tests done at our clinic.     All labs acceptable.      Result Name Current Result Previous Result Reference Range   Potassium Level (mmol/L)  4.6 12/4/2019  3.9 9/10/2018 3.5 - 5.3   Glucose Level (mg/dL) ((H)) 109 12/4/2019 ((H)) 114 9/10/2018 65 - 99   Creatinine Level (mg/dL)  0.90 12/4/2019  0.84 9/10/2018 0.70 - 1.18   Cholesterol (mg/dL)  134 12/4/2019  147 9/10/2018  - <200   HDL (mg/dL)  49 12/4/2019  51 9/10/2018 >40 -    LDL  66 12/4/2019  77 9/10/2018    Triglyceride (mg/dL)  109 12/4/2019  106 9/10/2018  - <150       Please contact me or my assistant at [phone] if you have any questions.     Sincerely,        Otilio Moya MD        What do your labs mean?  Below is a glossary of commonly ordered labs:  LDL   Bad Cholesterol   HDL   Good Cholesterol  AST/ALT   Liver Function   Cr/Creatinine   Kidney Function  Microalbumin   Kidney Function  BUN   Kidney Function  PSA   Prostate    TSH   Thyroid Hormone  HgbA1c   Diabetes Test   Hgb (Hemoglobin)   Red Blood Cells

## 2022-02-16 NOTE — TELEPHONE ENCOUNTER
Entered by Pia Lewis CMA on September 24, 2021 12:19:34 PM CDT  ---------------------  From: Pia Lewis CMA   To: [facility] #[id number]    Sent: 9/24/2021 12:19:34 PM CDT  Subject: FW: Medication Management     ** Submitted: **  Order:omeprazole (omeprazole 20 mg oral delayed release capsule)  1 cap(s)  Oral  daily  Qty:  90 cap(s)        Days Supply:  90        Refills:  0          Substitutions Allowed     PRN  AS NEEDED FOR HEARTBURN      [id number]    Signed by Pia Lewis CMA  9/24/2021 5:19:00 PM [facility]    ** Not Approved:  **  omeprazole (OMEPRAZOLE 20MG CAPSULES)  TAKE 1 CAPSULE BY MOUTH DAILY AS NEEDED FOR HEARTBURN  Qty:  90 cap(s)        Days Supply:  90        Refills:  0          Substitutions Allowed     Route To Pharmacy - [facility] #[id number]   Signed by Pia Lewis CMA            ---------------------  From: Pia Lewis CMA (eRx Pool ([id number]_[facility]))   To: [employer] Message Pool ([id number]_[place]);     Sent: 9/23/2021 3:47:31 PM CDT  Subject: FW: Medication Management   Due Date/Time: 9/24/2021 12:33:00 PM CDT           ------------------------------------------  [id number]  To: Otilio Moya MD  Sent: September 23, 2021 12:33:14 PM CDT  Subject: Medication Management  Due: September 17, 2021 11:19:26 PM CDT     ** On Hold Pending Signature **     Dispensed Drug: omeprazole (omeprazole 20 mg oral delayed release capsule), TAKE 1 CAPSULE BY MOUTH DAILY AS NEEDED FOR HEARTBURN  Quantity: 90 cap(s)  Days Supply: 90  Refills: 0  Substitutions Allowed  Notes from Pharmacy:  ------------------------------------------Refilled. AWV in July with TFS mention of med working.

## 2022-02-16 NOTE — LETTER
(Inserted Image. Unable to display)       March 14, 2019      MARIAH FRANCOIS  [address], [place] [id number]          Dear MARIAH,      Thank you for selecting [facility] for your healthcare needs.     Our records indicate you are due for the following services:     Hypertension Check ~ Please remember to bring any at-home blood pressure readings with you to your appointment.    To schedule an appointment or if you have further questions, please contact your primary clinic:   [facility]       [phone]   [facility]       [phone]              [facility]     [phone]    Powered by [employer]    Sincerely,    Otilio Moya MD

## 2022-02-16 NOTE — LETTER
(Inserted Image. Unable to display)            July 21, 2021        MARIAH FRANCOIS      [address] [place] [address]        Dear MARIAH,    Thank you for selecting [facility]  for your healthcare needs.  Below you will find the results of the recent tests done at our clinic.     All labs acceptable.      Result Name Current Result Previous Result Reference Range   Hgb A1c  5.5 7/20/2021  5.6 1/5/2021  - <5.7       Please contact me or my assistant at [phone] if you have any questions.     Sincerely,        Otilio Moya MD        What do your labs mean?  Below is a glossary of commonly ordered labs:  LDL   Bad Cholesterol   HDL   Good Cholesterol  AST/ALT   Liver Function   Cr/Creatinine   Kidney Function  Microalbumin   Kidney Function  BUN   Kidney Function  PSA   Prostate    TSH   Thyroid Hormone  HgbA1c   Diabetes Test   Hgb (Hemoglobin)   Red Blood Cells

## 2022-02-16 NOTE — NURSING NOTE
Comprehensive Intake Entered On:  1/5/2021 10:44 AM CST    Performed On:  1/5/2021 10:38 AM CST by Pia Lewis CMA               Summary   Chief Complaint :   HTN f/u. Pt is fasting.   Advance Directive :   No   Weight Measured :   326 lb(Converted to: 326 lb 0 oz, 147.871 kg)    Systolic Blood Pressure :   116 mmHg   Diastolic Blood Pressure :   72 mmHg   Mean Arterial Pressure :   87 mmHg   Peripheral Pulse Rate :   64 bpm   BP Site :   Right arm   Pulse Site :   Radial artery   BP Method :   Manual   HR Method :   Manual   Temperature Tympanic :   97.4 DegF(Converted to: 36.3 DegC)  (LOW)    Race :      Languages :   English   Ethnicity :   Not  or    Pia Lewis CMA - 1/5/2021 10:38 AM CST   Health Status   Allergies Verified? :   Yes   Medication History Verified? :   Yes   Immunizations Current :   Yes   Pre-Visit Planning Status :   Completed   Tobacco Use? :   Former smoker   Pia Lewis CMA - 1/5/2021 10:38 AM CST   Meds / Allergies   (As Of: 1/5/2021 10:44:12 AM CST)   Allergies (Active)   Zestril  Estimated Onset Date:   Unspecified ; Reactions:   Cough ; Created By:   Lolis Huggins; Reaction Status:   Active ; Category:   Drug ; Substance:   Zestril ; Type:   Intolerance ; Updated By:   Lolis Huggins; Source:   Paper Chart ; Reviewed Date:   6/24/2020 1:07 PM CDT        Medication List   (As Of: 1/5/2021 10:44:12 AM CST)   Prescription/Discharge Order    atenolol-chlorthalidone  :   atenolol-chlorthalidone ; Status:   Prescribed ; Ordered As Mnemonic:   atenolol-chlorthalidone 100 mg-25 mg oral tablet ; Simple Display Line:   1 tab(s), Oral, daily, 30 tab(s), 0 Refill(s) ; Ordering Provider:   Otilio Moya MD; Catalog Code:   atenolol-chlorthalidone ; Order Dt/Tm:   12/11/2020 2:41:34 PM CST          losartan  :   losartan ; Status:   Prescribed ; Ordered As Mnemonic:   losartan 25 mg oral tablet ; Simple Display Line:   1 tab(s), Oral, daily, 30 tab(s), 0  Refill(s) ; Ordering Provider:   Otilio Moya MD; Catalog Code:   losartan ; Order Dt/Tm:   12/11/2020 2:41:57 PM CST          lovastatin  :   lovastatin ; Status:   Prescribed ; Ordered As Mnemonic:   lovastatin 40 mg oral tablet ; Simple Display Line:   1 tab(s), Oral, daily, 30 tab(s), 0 Refill(s) ; Ordering Provider:   Otilio Moya MD; Catalog Code:   lovastatin ; Order Dt/Tm:   12/11/2020 2:42:20 PM CST          omeprazole  :   omeprazole ; Status:   Prescribed ; Ordered As Mnemonic:   omeprazole 20 mg oral delayed release capsule ; Simple Display Line:   20 mg, 1 cap(s), Oral, daily, PRN: for heartburn, 90 cap(s), 1 Refill(s) ; Ordering Provider:   Otilio Moya MD; Catalog Code:   omeprazole ; Order Dt/Tm:   12/11/2019 9:11:27 AM CST          potassium chloride  :   potassium chloride ; Status:   Prescribed ; Ordered As Mnemonic:   potassium chloride 20 mEq oral tablet, extended release ; Simple Display Line:   20 mEq, 1 tab(s), po, daily, 90 tab(s), 1 Refill(s) ; Ordering Provider:   Otilio Moya MD; Catalog Code:   potassium chloride ; Order Dt/Tm:   6/24/2020 1:11:07 PM CDT            Home Meds    aspirin  :   aspirin ; Status:   Documented ; Ordered As Mnemonic:   aspirin 325 mg oral tablet ; Simple Display Line:   325 mg, 1 tab(s), po, daily, 0 Refill(s) ; Catalog Code:   aspirin ; Order Dt/Tm:   6/1/2016 10:09:56 AM CDT          multivitamin  :   multivitamin ; Status:   Documented ; Ordered As Mnemonic:   Multiple Vitamins oral tablet ; Simple Display Line:   1 tab(s), Oral, daily, 0 Refill(s) ; Catalog Code:   multivitamin ; Order Dt/Tm:   5/14/2019 8:08:36 AM CDT            ID Risk Screen   Recent Travel History :   No recent travel   Family Member Travel History :   No recent travel   Other Exposure to Infectious Disease :   Unknown   Pia Lewis CMA - 1/5/2021 10:38 AM CST   Social History   Social History   (As Of: 1/5/2021 10:44:12 AM CST)   Alcohol:  Current       Current, 1-2 times per week, 2 drinks/episode average.  2 drinks/episode maximum.   (Last Updated: 12/8/2016 4:43:16 PM CST by Hien Rodas)          Tobacco:  Past      Former smoker, quit more than 30 days ago, Snuff   (Last Updated: 1/5/2021 10:38:59 AM CST by Pia Lewis CMA)          Electronic Cigarette/Vaping:        Electronic Cigarette Use: Never.   (Last Updated: 1/5/2021 10:38:50 AM CST by Pia Lewis CMA)          Substance Abuse:  Denies Substance Abuse      Never   (Last Updated: 4/1/2015 10:24:34 AM CDT by Sangita William)          Employment/School:        Retired   (Last Updated: 12/8/2016 11:35:06 AM CST by Jas De La Cruz CMA)          Home/Environment:        Marital status: .  Spouse/Partner name: Oneyda..  3 children.   (Last Updated: 4/1/2015 10:24:25 AM CDT by Sangita William)          Nutrition/Health:        Type of diet: Regular.   (Last Updated: 9/18/2013 10:20:53 AM CDT by Sangita William)          Exercise:  Does not exercise       Comments:  12/8/2016 4:43 PM - Hien Rodas: No regular exercise   (Last Updated: 12/8/2016 4:43:42 PM CST by Hien Rodas)

## 2022-02-16 NOTE — PROGRESS NOTES
Patient:   MARIAH FRANCOIS JR            MRN: [id number]            FIN: [id number]               Age:   66 years     Sex:  Male     :  1950   Associated Diagnoses:   Lipids abnormal; HTN (Hypertension); Vitamin D Deficiency; Prediabetes; TAWANDA (obstructive sleep apnea); Obesity; Nonalcoholic steatohepatitis (NUÑEZ)   Author:   Otilio Moya MD      Visit Information      Date of Service: 2017 09:43 am  Performing Location: [facility]  Encounter#: [id number]      Primary Care Provider (PCP):  Otilio Moya MD    NPLENIN# [id number]      Referring Provider:  Otilio Moya MD# [id number]      Chief Complaint   2017 9:58 AM CDT    HTN check up.        History of Present Illness             The patient presents for follow-up evaluation of hypertension, No qualifying data available.   .  The context of the hypertension: blood pressure was maintained within the target range.  Exacerbating factors consist of none.  Relieving factors consist of medication.  Associated symptoms consist of none.        Interval History   Cholesterol Management   Total cholesterol results < 200 mg/dL (optimal).  LDL cholesterol results < 100 mg/dL (optimal).  Associated symptoms characterized by no fatigue, chest pain, joint pain, muscle weakness or myalgias.        Review of Systems   Constitutional:  Negative.    Eye:  Negative.    Ear/Nose/Mouth/Throat:  Negative.    Respiratory:  Negative.    Cardiovascular:  Negative.    Gastrointestinal:  Negative.    Genitourinary:  Negative.    Hematology/Lymphatics:  Negative.    Endocrine:  Negative.    Immunologic:  Negative.    Musculoskeletal:  Negative.    Integumentary:  Negative.    Neurologic:  Negative.       Health Status   Allergies:    Nonallergic Reactions (Selected)  Severity Not Documented  Zestril (Cough)   Medications:  (Selected)   Prescriptions  Prescribed  PriLOSEC 20 mg oral delayed release capsule: 1 cap(s) ( 20 mg ), po, daily, # 90  cap(s), 1 Refill(s), Type: Maintenance, Pharmacy: [id number], 1 cap(s) po daily  atenolol-chlorthalidone 100 mg-25 mg oral tablet: 1 tab(s), po, daily, # 90 tab(s), 1 Refill(s), Type: Maintenance, Pharmacy: [id number], 1 tab(s) po daily  betamethasone dipropionate 0.05% topical ointment: 1 uzma, top, bid, # 45 gm, 2 Refill(s), Type: Maintenance, Pharmacy: [id number], 1 uzma top bid  lovastatin 40 mg oral tablet: 1 tab(s) ( 40 mg ), po, daily, # 90 tab(s), 1 Refill(s), Type: Maintenance, Pharmacy: [id number], 1 tab(s) po daily  Documented Medications  Documented  aspirin 325 mg oral tablet: 1 tab(s) ( 325 mg ), po, daily, 0 Refill(s), Type: Maintenance   Problem list:    All Problems  HTN (hypertension) / SNOMED CT 0265578154 / Confirmed  ED (erectile dysfunction) / SNOMED CT 5559081530 / Confirmed  Adenoma / ICD-9-.9 / Confirmed  Obesity / SNOMED CT 0778397971 / Probable  Hemorrhoids, Internal / ICD-9-.0 / Confirmed  Status post skin graft / ICD-9-CM V42.3 / Confirmed  Lipids abnormal / ICD-9-.9 / Confirmed  Incomplete RBBB / SNOMED CT 25315398 / Confirmed  Vitamin D Deficiency / SNOMED CT 46913083 / Confirmed  Prediabetes / SNOMED CT 348239781 / Confirmed  TAWANDA (obstructive sleep apnea) / SNOMED CT 204287098 / Confirmed  Nonalcoholic steatohepatitis (NUÑEZ) / SNOMED CT 8003337198 / Confirmed  Acquired polycythemia / SNOMED CT 485562157 / Confirmed  Aneurysm of thoracic aorta / SNOMED CT 4792805490 / Confirmed  Seasonal allergies / SNOMED CT 797959681 / Confirmed  Resolved: History of chicken pox / SNOMED CT 433458921      Histories   Past Medical History:    Active  TAWANDA (obstructive sleep apnea) (994180069): Onset on 5/13/2015 at 64 years.  Status post skin graft (V42.3): Onset on 11/23/2012 at 62 years.  Adenoma (229.9): Onset in the month of 9/2009 at 58 years  Hemorrhoids, Internal (455.0): Onset in 2009 at 58 years.  HTN (hypertension) (7789064416)  ED  (erectile dysfunction) (1534003132)  Obesity (2358915940)  Seasonal allergies (498625383)  Resolved  History of chicken pox (300783058):  Resolved.   Family History:    Dementia  Mother (unknown, )  Leukemia  Father ()  Pacemaker  Mother (unknown, )     Procedure history:    Cardiac computed tomography for calcium scoring (SNOMED CT 7465518560) on 2016 at 65 Years.  Comments:  2016 10:42 AM - Hien Rodas  Coronary arterial calcium score is 0  colonoscopy performed by Rivera Lechuga MD on 2015 at 64 Years.  Comments:  7/3/2015 3:17 PM - Beatriz Meneses RN  Advanced adenoma    7/3/2015 2:58 PM - Beatriz Meenses RN  Sedation: Propofol  Findings: Sigmoid diverticulosis, tubular adenoma  F/U repeat colonoscopy in 3 years.  Polysomnogram (SNOMED CT 630924956) on 2015 at 64 Years.  Comments:  2015 1:12 PM - Renu Wheat CMA  AHI of 21/hr; increasing to 49/hr during REM  colonoscopy on 9/3/2009 at 58 Years.  Comments:  2010 1:06 PM - Deyanira Slaughter  repeat 2012  BCC (basal cell carcinoma), left leg (ICD-9-.71) performed by Gil De La Cruz MD on 10/6/2008 at 58 Years.  BCC (basal cell carcinoma), right shoulder (ICD-9-.61) performed by Gil De La Cruz MD on 10/6/2008 at 58 Years.  BCC (basal cell carcinoma),  right skull (ICD-9-.91) performed by Gil De La Cruz MD on 10/6/2008 at 58 Years.  Stress test ECG in  at 40 Years.  Tonsillectomy.   Social History:        Alcohol Assessment: Current            Current, 1-2 times per week, 2 drinks/episode average.  2 drinks/episode maximum.      Tobacco Assessment: Past            Former smoker, Oral      Substance Abuse Assessment: Denies Substance Abuse            Never      Employment and Education Assessment            Retired      Home and Environment Assessment            Marital status: .  Spouse/Partner name: Oneyda..  3 children.      Nutrition and Health Assessment             Type of diet: Regular.      Exercise and Physical Activity Assessment: Does not exercise                     Comments:                      12/08/2016 - Hien Rodas                     No regular exercise        Physical Examination   Vital Signs   6/14/2017 9:58 AM CDT Temperature Tympanic 98.5 DegF    Peripheral Pulse Rate 65 bpm    Systolic Blood Pressure 130 mmHg    Diastolic Blood Pressure 85 mmHg    Mean Arterial Pressure 100 mmHg      Measurements from flowsheet : Measurements   6/14/2017 9:58 AM CDT    Weight Measured - Standard                313.2 lb     General:  Alert and oriented, No acute distress.    Eye:  Normal conjunctiva.    HENT:  Normocephalic, Tympanic membranes are clear, Oral mucosa is moist, No pharyngeal erythema.    Neck:  Supple, Non-tender, No lymphadenopathy, No thyromegaly.    Respiratory:  Breath sounds are equal, Symmetrical chest wall expansion.         Respirations: Are within normal limits.         Pattern: Regular.         Breath sounds: Bilateral, Within normal limits.    Cardiovascular:  Normal rate, Regular rhythm, No murmur, Normal peripheral perfusion, No edema.    Gastrointestinal:  Soft, Non-tender, Non-distended, Normal bowel sounds, No organomegaly.    Genitourinary:  No costovertebral angle tenderness.    Musculoskeletal:  degenerative changes noted.    Integumentary:  Warm, Dry, No rash, No qualifying data available.   .    Neurologic:  Alert, Oriented.       Health Maintenance      Recommendations     Pending (in the next year)        OverDue           CBC due  08/21/13  and every 12  month(s)           CMP due  08/21/13  and every 12  month(s)           PSA due  08/21/13  and every 12  month(s)        Due            Abdominal Aortic Aneurysm Screen (if hx of smoking) due  06/14/17  One-time only           Fall Risk Screen (Male) due  06/14/17  and every 1  year(s)           Lung Cancer Screen (Male) due  06/14/17  and every 1  year(s)        Due In Future             Tetanus Vaccine not due until  11/28/17  and every 10  year(s)           Body Mass Index Check (Male) not due until  12/08/17  and every 1  year(s)           Influenza Vaccine not due until  12/08/17  and every 1  year(s)           Alcohol Misuse Screen (Male) not due until  12/08/17  and every 1  year(s)           Depression Screen (Male) not due until  12/08/17  and every 1  year(s)           Lipid Disorders Screen (Male) not due until  06/01/18  and every 1  year(s)           Type 2 Diabetes Mellitus Screen (Male) not due until  06/01/18  and every 1  year(s)     Satisfied (in the past 1 year)        Satisfied            Alcohol Misuse Screen (Male) on  12/08/16.           Body Mass Index Check (Male) on  12/08/16.           Depression Screen (Male) on  12/08/16.           Depression Screen (Male) on  12/08/16.           Depression Screen (Male) on  12/08/16.           High Blood Pressure Screen (Male) on  06/14/17.           High Blood Pressure Screen (Male) on  12/08/16.           Influenza Vaccine on  12/08/16.           Lipid Disorders Screen (Male) on  06/01/17.           Lipid Disorders Screen (Male) on  06/01/17.           Lipid Disorders Screen (Male) on  06/01/17.           Lipid Disorders Screen (Male) on  06/01/17.           Obesity Screen and Counseling (Male) on  06/14/17.           Obesity Screen and Counseling (Male) on  12/08/16.           Tobacco Use Screen (Male) on  06/14/17.           Tobacco Use Screen (Male) on  12/08/16.           Type 2 Diabetes Mellitus Screen (Male) on  06/01/17.           Type 2 Diabetes Mellitus Screen (Male) on  12/01/16.        Impression and Plan   Diagnosis     Lipids abnormal (PJB19-SH E78.89).     Course:  Progressing as expected.    Plan:  BMI,Weight loss,exercise,diet discussed.    Patient Instructions:       Counseled: Patient, Regarding diagnosis, Regarding treatment, Regarding medications.    Diagnosis     HTN (Hypertension) (HKF46-XW I10).     Vitamin D  Deficiency (AVN94-PY E55.9).     Prediabetes (OLB09-ZQ R73.09).     TAWANDA (obstructive sleep apnea) (SRX00-DM G47.33).     Obesity (KFW52-DD E66.9).     Nonalcoholic steatohepatitis (NUÑEZ) (TZV94-DH K75.81).     Course:  Progressing as expected, discussed fungal nail infection consider podiatry.    Plan:  Continue to follow with hematology  Recheck in six months  BMI,Weight loss,exercise,diet discussed.

## 2022-02-16 NOTE — LETTER
(Inserted Image. Unable to display)   July 06, 2021  MARIAH PRISCILA  [address] [place] [address]          Dear MARIAH,      Thank you for selecting [facility] for your healthcare needs.    Our records indicate you are due for the following services:    Annual Wellness Visit & Hypertension check ~ please remember to bring your at-home blood pressure readings with you to your appointment.   Non-Fasting Labs    If you had your labs done at another facility or with Direct Access Lab Testing at [facility], please bring in a copy of the results to your next visit, mail a copy, or drop off a copy of your results to your Healthcare Provider.    You are due for lab work and an office visit; please schedule the lab appointment 1 week before the office visit.  This will assure all results are available to discuss with your Healthcare Provider during your visit.    (FYI   Regarding office visits: In some instances, a video visit or telephone visit may be offered as an option.)    **It is very helpful if you bring your medication bottles to your appointment.  This assures we have all of your current medications, including strength and dosing information, documented accurately in your medical record.    To schedule an appointment or if you have further questions, please contact your clinic at [phone].     Powered by [employer]    Sincerely,    Otilio Moya M.D.

## 2022-02-16 NOTE — LETTER
(Inserted Image. Unable to display)   May 09, 2019      MARIAH FRANCOIS      [address], [place] [id number]        Dear MARIAH,    Thank you for selecting [facility] for your healthcare needs.  Below you will find the results of the recent tests done at our clinic.    We will discuss this at your next visit.      Result Name Current Result Previous Result Reference Range   Hgb A1c ((H)) 5.7 5/7/2019  5.6 9/10/2018  - <5.7   Vitamin D 25 OH (ng/mL) ((L)) 29 5/7/2019  30 - 100   PSA (ng/mL)  0.7 5/7/2019   - < OR = 4.0       Please contact me or my assistant at [phone] if you have any questions.     Sincerely,        Otilio Moya MD        What do your labs mean?  Below is a glossary of commonly ordered labs:  LDL   Bad Cholesterol   HDL   Good Cholesterol  AST/ALT   Liver Function   Cr/Creatinine   Kidney Function  Microalbumin   Kidney Function  BUN   Kidney Function  PSA   Prostate    TSH   Thyroid Hormone  HgbA1c   Diabetes Test   Hgb (Hemoglobin)   Red Blood Cells

## 2022-02-16 NOTE — TELEPHONE ENCOUNTER
Entered by Pia Lewis CMA on December 11, 2020 2:42:38 PM CST  ---------------------  From: Pia Lewis CMA   To: [facility] #[id number]    Sent: 12/11/2020 2:42:38 PM CST  Subject: Medication Management     ** Submitted: **  Order:lovastatin (lovastatin 40 mg oral tablet)  1 tab(s)  Oral  daily  Qty:  30 tab(s)        Refills:  0          Substitutions Allowed     Route To [facility] #[id number]    Signed by Pia Lewis CMA  12/11/2020 8:42:00 PM UT    ** Submitted: **  Complete:lovastatin (lovastatin 40 mg oral tablet)   Signed by Pia Lewis CMA  12/11/2020 8:42:00 PM UT    ** Not Approved:  **  lovastatin (LOVASTATIN 40MG TABLETS)  TAKE 1 TABLET BY MOUTH DAILY  Qty:  90 tab(s)        Days Supply:  90        Refills:  0          Substitutions Allowed     Route To [facility] #[id number]   Signed by Pia Lewis CMA            ** Submitted: **  Order:losartan (losartan 25 mg oral tablet)  1 tab(s)  Oral  daily  Qty:  30 tab(s)        Refills:  0          Substitutions Allowed     Route To [facility] #[id number]    Signed by Pia Lewis CMA  12/11/2020 8:41:00 PM UT    ** Submitted: **  Complete:losartan (losartan 25 mg oral tablet)   Signed by Pia Lewis CMA  12/11/2020 8:42:00 PM UT    ** Not Approved:  **  losartan (LOSARTAN 25MG TABLETS)  TAKE 1 TABLET BY MOUTH DAILY  Qty:  90 tab(s)        Days Supply:  90        Refills:  0          Substitutions Allowed     Route To [facility] #[id number]   Signed by Pia Lewis CMA            ** Submitted: **  Order:atenolol-chlorthalidone (atenolol-chlorthalidone 100 mg-25 mg oral tablet)  1 tab(s)  Oral  daily  Qty:  30 tab(s)        Refills:  0          Substitutions Allowed     Route To [facility] #[id number]    Signed by Pia Lewis CMA  12/11/2020 8:41:00 PM UT    ** Submitted: **  Complete:atenolol-chlorthalidone (atenolol-chlorthalidone 100  mg-25 mg oral tablet)   Signed by Pia Lewis CMA  12/11/2020 8:41:00 PM [facility]    ** Not Approved:  **  atenolol-chlorthalidone (ATENOLOL 100MG/CHLORTHAL 25MG TABS)  TAKE 1 TABLET BY MOUTH DAILY  Qty:  90 tab(s)        Days Supply:  90        Refills:  0          Substitutions Allowed     Route To Pharmacy - [facility] #[id number]   Signed by Pia Lewis CMA            ------------------------------------------  From: [facility] #[id number]  To: Otilio Moya MD  Sent: December 11, 2020 3:41:49 AM CST  Subject: Medication Management  Due: December 8, 2020 10:07:04 AM CST     ** On Hold Pending Signature **     Dispensed Drug: losartan (losartan 25 mg oral tablet), TAKE 1 TABLET BY MOUTH DAILY  Quantity: 90 tab(s)  Days Supply: 90  Refills: 0  Substitutions Allowed  Notes from Pharmacy:     ** On Hold Pending Signature **     Dispensed Drug: atenolol-chlorthalidone (atenolol-chlorthalidone 100 mg-25 mg oral tablet), TAKE 1 TABLET BY MOUTH DAILY  Quantity: 90 tab(s)  Days Supply: 90  Refills: 0  Substitutions Allowed  Notes from Pharmacy:     ** On Hold Pending Signature **     Dispensed Drug: lovastatin (lovastatin 40 mg oral tablet), TAKE 1 TABLET BY MOUTH DAILY  Quantity: 90 tab(s)  Days Supply: 90  Refills: 0  Substitutions Allowed  Notes from Pharmacy:  ------------------------------------------Refilled per protocol. RTC in place for this month. Note also sent to pharm.

## 2022-02-16 NOTE — PROGRESS NOTES
Patient:   MARIAH FRANCOIS JR            MRN: [id number]            FIN: [id number]               Age:   69 years     Sex:  Male     :  1950   Associated Diagnoses:   HTN (Hypertension); Vitamin D Deficiency; Prediabetes; TAWANDA (obstructive sleep apnea); Obesity; Nonalcoholic steatohepatitis (NUÑEZ); Adenoma; Aneurysm of thoracic aorta; Celiac artery dissection   Author:   Otilio Moya MD      Visit Information      Date of Service: 2020 12:40 pm  Performing Location: [facility]  Encounter#: [id number]      Primary Care Provider (PCP):  Otilio Moya MD    NPI# [id number]      Referring Provider:  Otilio Moya MD# [id number]      Chief Complaint   2020 12:44 PM CDT   HTN check and med refills        History of Present Illness   Patient is here for 6-month follow-up.  Overall is been doing well he is staying safe with the current pandemic.  Blood pressures have been well controlled on his atenolol chlorthalidone and losartan.  He is on his lovastatin for his cholesterol omeprazole for his reflux.  His arthritis is been stable with his back he does see chiropractor.  He is not using his CPAP does not tolerate does not want to do any thing with that.  Cardiology have been following him for his aortic dilatation.         Interval History   Some djd issues especially back Sees chiro  See cards for aortic dilitation  Not using cpap      Review of Systems   Constitutional:  Negative.    Eye:  Negative.    Ear/Nose/Mouth/Throat:  Negative.    Respiratory:  Negative.    Cardiovascular:  Negative.    Gastrointestinal:  Negative.    Genitourinary:  Negative.    Hematology/Lymphatics:  Negative.    Endocrine:  Negative.    Immunologic:  Negative.    Musculoskeletal:  Negative except as documented in history of present illness.    Integumentary:  Negative.    Neurologic:  Negative.       Health Status   Allergies:    Nonallergic Reactions (Selected)  Severity Not  Documented  Zestril (Cough)   Medications:  (Selected)   Prescriptions  Prescribed  atenolol-chlorthalidone 100 mg-25 mg oral tablet: 1 tab(s), po, daily, # 90 tab(s), 1 Refill(s), Type: Maintenance, Pharmacy: [facility][id number], 1 tab(s) Oral daily  losartan 25 mg oral tablet: = 1 tab(s) ( 25 mg ), Oral, daily, # 90 tab(s), 1 Refill(s), Type: Maintenance, Pharmacy: [facility] #[id number], 1 tab(s) Oral daily  lovastatin 40 mg oral tablet: = 1 tab(s) ( 40 mg ), Oral, daily, # 90 tab(s), 1 Refill(s), Type: Maintenance, Pharmacy: [facility] #[id number], 1 tab(s) Oral daily  omeprazole 20 mg oral delayed release capsule: = 1 cap(s) ( 20 mg ), Oral, daily, PRN: for heartburn, # 90 cap(s), 1 Refill(s), Type: Maintenance, Pharmacy: [facility] #[id number], 1 cap(s) Oral daily,PRN:for heartburn  potassium chloride 20 mEq oral tablet, extended release: = 1 tab(s) ( 20 mEq ), po, daily, # 90 tab(s), 1 Refill(s), Type: Maintenance, [id number], 1 tab(s) Oral daily  Documented Medications  Documented  Multiple Vitamins oral tablet: 1 tab(s), Oral, daily, 0 Refill(s), Type: Maintenance  aspirin 325 mg oral tablet: 1 tab(s) ( 325 mg ), po, daily, 0 Refill(s), Type: Maintenance,    Medications          *denotes recorded medication          *aspirin 325 mg oral tablet: 325 mg, 1 tab(s), po, daily, 0 Refill(s).          atenolol-chlorthalidone 100 mg-25 mg oral tablet: 1 tab(s), po, daily, 90 tab(s), 1 Refill(s).          losartan 25 mg oral tablet: 25 mg, 1 tab(s), Oral, daily, 90 tab(s), 1 Refill(s).          lovastatin 40 mg oral tablet: 40 mg, 1 tab(s), Oral, daily, 90 tab(s), 1 Refill(s).          *Multiple Vitamins oral tablet: 1 tab(s), Oral, daily, 0 Refill(s).          omeprazole 20 mg oral delayed release capsule: 20 mg, 1 cap(s), Oral, daily, PRN: for heartburn, 90 cap(s), 1 Refill(s).          potassium chloride 20 mEq oral tablet, extended release: 20 mEq, 1 tab(s), po,  daily, 90 tab(s), 1 Refill(s).       Problem list:    All Problems (Selected)  Adenoma / ICD-9-.9 / Confirmed  Aneurysm of thoracic aorta / SNOMED CT 6163004031 / Confirmed  Hemorrhoids, Internal / ICD-9-.0 / Confirmed  HTN (hypertension) / SNOMED CT 1407206980 / Confirmed  Lipids abnormal / ICD-9-.9 / Confirmed  Nonalcoholic steatohepatitis (NUÑEZ) / SNOMED CT 4930054315 / Confirmed  Obesity / SNOMED CT 2130939065 / Probable  TAWANDA (obstructive sleep apnea) / SNOMED CT 055676862 / Confirmed  ED (erectile dysfunction) / SNOMED CT 1200793359 / Confirmed  Prediabetes / SNOMED CT 678288228 / Confirmed  Incomplete RBBB / SNOMED CT 21100614 / Confirmed  Acquired polycythemia / SNOMED CT 955281210 / Confirmed  Seasonal allergies / SNOMED CT 480232778 / Confirmed  Status post skin graft / ICD-9-CM V42.3 / Confirmed  Vitamin D Deficiency / SNOMED CT 24681157 / Confirmed      Histories   Past Medical History:    Active  TAWANDA (obstructive sleep apnea) (613863120): Onset on 2015 at 64 years.  Status post skin graft (V42.3): Onset on 2012 at 62 years.  Adenoma (229.9): Onset in the month of 2009 at 58 years  Hemorrhoids, Internal (455.0): Onset in  at 58 years.  HTN (hypertension) (0953894600)  ED (erectile dysfunction) (0560384250)  Obesity (1368734184)  Seasonal allergies (655071097)  Resolved  History of chicken pox (092065214):  Resolved.   Family History:    Dementia  Mother (unknown, )  Leukemia  Father ()  Pacemaker  Mother (unknown, )     Procedure history:    Colonoscopy (SNOMED CT 278054027) performed by Stan Wall MD on 10/4/2018 at 68 Years.  Comments:  10/11/2018 8:28 AM CDT - Maru Ramirez  Indication:  History of polyps.  Sedation:  MAC.  Impression:  One 5 m polyp in rectum.  Diverticulosis in sigmoid colon.  Cardiac computed tomography for calcium scoring (SNOMED CT 4642536448) on 2016 at 65 Years.  Comments:  2016 10:42 AM CDT -  Hien Rodas  Coronary arterial calcium score is 0  colonoscopy performed by Rivera Lechuga MD on 6/24/2015 at 64 Years.  Comments:  7/3/2015 3:17 PM CDT - Beatriz Meneses RN  Advanced adenoma    7/3/2015 2:58 PM CDT - Beatriz Meneses RN  Sedation: Propofol  Findings: Sigmoid diverticulosis, tubular adenoma  F/U repeat colonoscopy in 3 years.  Polysomnogram (SNOMED CT 107240026) on 5/13/2015 at 64 Years.  Comments:  6/17/2015 1:12 PM CDT - Renu Wheat CMA  AHI of 21/hr; increasing to 49/hr during REM  colonoscopy on 9/3/2009 at 58 Years.  Comments:  6/18/2010 1:06 PM CDT - Deyanira Slaughter  repeat 2012  BCC (basal cell carcinoma), left leg (ICD-9-.71) performed by Gil De La Cruz MD on 10/6/2008 at 58 Years.  BCC (basal cell carcinoma), right shoulder (ICD-9-.61) performed by Gil De La Cruz MD on 10/6/2008 at 58 Years.  BCC (basal cell carcinoma),  right skull (ICD-9-.91) performed by Gil De La Cruz MD on 10/6/2008 at 58 Years.  Stress test ECG in 1990 at 40 Years.  Tonsillectomy.   Social History:        Alcohol Assessment: Current            Current, 1-2 times per week, 2 drinks/episode average.  2 drinks/episode maximum.      Tobacco Assessment: Past            Former smoker, Oral      Substance Abuse Assessment: Denies Substance Abuse            Never      Employment and Education Assessment            Retired      Home and Environment Assessment            Marital status: .  Spouse/Partner name: Oneyda..  3 children.      Nutrition and Health Assessment            Type of diet: Regular.      Exercise and Physical Activity Assessment: Does not exercise                     Comments:                      12/08/2016 - Hien Rodas                     No regular exercise        Physical Examination   Vital Signs   6/24/2020 12:44 PM CDT Temperature Tympanic 96.7 DegF  LOW    Peripheral Pulse Rate 62 bpm    Systolic Blood Pressure 123 mmHg    Diastolic Blood Pressure 79 mmHg     Mean Arterial Pressure 94 mmHg    BP Site Right arm    BP Method Electronic      Measurements from flowsheet : Measurements   6/24/2020 12:44 PM CDT Height Measured - Standard 71 in    Weight Measured - Standard 324 lb    BSA 2.71 m2    Body Mass Index 45.18 kg/m2  HI      General:  Alert and oriented, No acute distress.    Eye:  Normal conjunctiva.    HENT:  Normocephalic, Tympanic membranes are clear, Oral mucosa is moist, No pharyngeal erythema.    Neck:  Supple, Non-tender, No lymphadenopathy, No thyromegaly.    Respiratory:  Breath sounds are equal, Symmetrical chest wall expansion.         Respirations: Are within normal limits.         Pattern: Regular.         Breath sounds: Bilateral, Within normal limits.    Cardiovascular:  Normal rate, Regular rhythm, No murmur, Normal peripheral perfusion, No edema.    Gastrointestinal:  Soft, Non-tender, Non-distended, Normal bowel sounds, No organomegaly.    Genitourinary:  No costovertebral angle tenderness.    Musculoskeletal:  degenerative changes noted.    Integumentary:  Warm, Dry, No rash, see below.    Neurologic:  Alert, Oriented.       Health Maintenance      Recommendations     Pending (in the next year)        OverDue           CBC due  08/21/13  and every 12  month(s)           CMP due  08/21/13  and every 12  month(s)           PSA due  08/21/13  and every 12  month(s)           Alcohol Misuse Screen (Male) due  12/08/17  and every 1  year(s)           Depression Screen (Male) due  12/08/17  and every 1  year(s)           Type 2 Diabetes Mellitus Screen (Male) due  05/07/20  and every 1  year(s)        Due            Abdominal Aortic Aneurysm Screen (if hx of smoking) due  06/24/20  One-time only           Fall Risk Screen (Male) due  06/24/20  and every 1  year(s)           Hepatitis C Screen 5595-2576 (Male) due  06/24/20  One-time only           Lung Cancer Screen (Male) due  06/24/20  and every 1  year(s)        Due In Future            Influenza  Vaccine not due until  08/31/20  and every 1  year(s)           Lipid Disorders Screen (Male) not due until  12/04/20  and every 1  year(s)           Aspirin Therapy for Prevention of CVD (Male) not due until  06/01/21  and every 5  year(s)     Satisfied (in the past 1 year)        Satisfied            Body Mass Index Check (Male) on  06/24/20.           Body Mass Index Check (Male) on  12/11/19.           High Blood Pressure Screen (Male) on  06/24/20.           High Blood Pressure Screen (Male) on  12/11/19.           Influenza Vaccine on  10/16/19.           Lipid Disorders Screen (Male) on  12/04/19.           Lipid Disorders Screen (Male) on  12/04/19.           Lipid Disorders Screen (Male) on  12/04/19.           Lipid Disorders Screen (Male) on  12/04/19.           Obesity Screen and Counseling (Male) on  06/24/20.           Obesity Screen and Counseling (Male) on  12/11/19.           Tobacco Use Screen (Male) on  06/24/20.          Review / Management   Results review      Impression and Plan   Diagnosis     HTN (Hypertension) (BWZ88-QG I10).     Vitamin D Deficiency (COR34-JJ E55.9).     Prediabetes (OXK83-IT R73.09).     TAWANDA (obstructive sleep apnea) (FAH87-HO G47.33).     Obesity (XIL82-OH E66.9).     Nonalcoholic steatohepatitis (NUÑEZ) (ZMI47-IR K75.81).     Adenoma (WGZ47-EX D36.9).     Aneurysm of thoracic aorta (VBA68-CQ I71.2).     Celiac artery dissection (DXU13-JP I77.79).     Course:  Progressing as expected.    Plan:  Overall he is been doing well he has 2 areas of actinic keratoses both 1 cm in size one on his left cheek one in his left external ear we froze both of them x3 with liquid nitrogen.  Continue with his current medications for his lipids and hypertension.  Follow-up in 6 months.     , resume vit d.    Patient Instructions:       Counseled: Patient, Regarding diagnosis, Regarding treatment, Regarding medications, Diet, Activity.

## 2022-02-16 NOTE — TELEPHONE ENCOUNTER
Entered by Beth Marroquin CMA on July 01, 2021 12:29:31 PM CDT  ---------------------  From: Beth Marroquin CMA   To: [facility] #[id number]    Sent: 7/1/2021 12:29:31 PM CDT  Subject: Medication Management     ** Not Approved: Patient needs appointment, protocol fill sent today **  lovastatin (LOVASTATIN 40MG TABLETS)  TAKE 1 TABLET BY MOUTH DAILY  Qty:  90 tab(s)        Days Supply:  90        Refills:  0          Substitutions Allowed     Route To [facility] #[id number]   Note from Pharmacy:  **Patient requests 90 days supply**  Signed by Beth Marroquin CMA            ** Not Approved: Patient needs appointment, protocol fill sent today **  losartan (LOSARTAN 25MG TABLETS)  TAKE 1 TABLET BY MOUTH DAILY  Qty:  90 tab(s)        Days Supply:  90        Refills:  0          Substitutions Allowed     Route To [facility] #[id number]   Note from Pharmacy:  **Patient requests 90 days supply**  Signed by Beth Marroquin CMA            ** Not Approved: Patient needs appointment, protocol fill sent today **  atenolol-chlorthalidone (ATENOLOL 100MG/CHLORTHAL 25MG TABS)  TAKE 1 TABLET BY MOUTH DAILY  Qty:  90 tab(s)        Days Supply:  90        Refills:  0          Substitutions Allowed     Route To [facility] #[id number]   Note from Pharmacy:  **Patient requests 90 days supply**  Signed by Beth Marroquin CMA            ------------------------------------------  From: [facility] #[id number]  To: Otilio Moya MD  Sent: July 1, 2021 6:16:49 AM CDT  Subject: Medication Management  Due: June 4, 2021 8:26:15 PM CDT     ** On Hold Pending Signature **     Dispensed Drug: atenolol-chlorthalidone (atenolol-chlorthalidone 100 mg-25 mg oral tablet), TAKE 1 TABLET BY MOUTH DAILY  Quantity: 90 tab(s)  Days Supply: 90  Refills: 0  Substitutions Allowed  Notes from Pharmacy: **Patient requests 90 days supply**     ** On Hold Pending Signature **     Dispensed Drug: losartan (losartan  25 mg oral tablet), TAKE 1 TABLET BY MOUTH DAILY  Quantity: 90 tab(s)  Days Supply: 90  Refills: 0  Substitutions Allowed  Notes from Pharmacy: **Patient requests 90 days supply**     ** On Hold Pending Signature **     Dispensed Drug: lovastatin (lovastatin 40 mg oral tablet), TAKE 1 TABLET BY MOUTH DAILY  Quantity: 90 tab(s)  Days Supply: 90  Refills: 0  Substitutions Allowed  Notes from Pharmacy: **Patient requests 90 days supply**  ------------------------------------------

## 2022-02-26 PROBLEM — R73.02 IMPAIRED GLUCOSE TOLERANCE: Status: ACTIVE | Noted: 2022-02-26

## 2022-02-26 PROBLEM — E66.01 MORBID OBESITY (H): Status: ACTIVE | Noted: 2022-02-26

## 2022-02-26 PROBLEM — J30.2 SEASONAL ALLERGIC RHINITIS: Status: ACTIVE | Noted: 2022-02-26

## 2022-02-26 PROBLEM — D75.1 SECONDARY POLYCYTHEMIA: Status: ACTIVE | Noted: 2022-02-26

## 2022-02-26 PROBLEM — I10 HYPERTENSION: Status: ACTIVE | Noted: 2022-02-26

## 2022-02-26 PROBLEM — K75.81 NONALCOHOLIC STEATOHEPATITIS (NASH): Status: ACTIVE | Noted: 2022-02-26

## 2022-02-26 PROBLEM — E55.9 VITAMIN D DEFICIENCY: Status: ACTIVE | Noted: 2022-02-26

## 2022-02-26 PROBLEM — I71.20 THORACIC AORTIC ANEURYSM (TAA) (H): Status: ACTIVE | Noted: 2022-02-26

## 2022-03-01 ENCOUNTER — OFFICE VISIT (OUTPATIENT)
Dept: FAMILY MEDICINE | Facility: CLINIC | Age: 72
End: 2022-03-01
Payer: COMMERCIAL

## 2022-03-01 VITALS
TEMPERATURE: 97.1 F | HEART RATE: 68 BPM | WEIGHT: 311 LBS | BODY MASS INDEX: 43.38 KG/M2 | DIASTOLIC BLOOD PRESSURE: 80 MMHG | SYSTOLIC BLOOD PRESSURE: 122 MMHG

## 2022-03-01 DIAGNOSIS — E78.9 DISORDER OF LIPOPROTEIN AND LIPID METABOLISM: ICD-10-CM

## 2022-03-01 DIAGNOSIS — E55.9 VITAMIN D DEFICIENCY: ICD-10-CM

## 2022-03-01 DIAGNOSIS — K75.81 NONALCOHOLIC STEATOHEPATITIS (NASH): ICD-10-CM

## 2022-03-01 DIAGNOSIS — I10 HYPERTENSION, UNSPECIFIED TYPE: ICD-10-CM

## 2022-03-01 DIAGNOSIS — D75.1 SECONDARY POLYCYTHEMIA: Primary | ICD-10-CM

## 2022-03-01 DIAGNOSIS — R73.02 IMPAIRED GLUCOSE TOLERANCE: ICD-10-CM

## 2022-03-01 LAB
ALBUMIN SERPL-MCNC: 3.8 G/DL (ref 3.4–5)
ALP SERPL-CCNC: 47 U/L (ref 40–150)
ALT SERPL W P-5'-P-CCNC: 18 U/L (ref 0–70)
ANION GAP SERPL CALCULATED.3IONS-SCNC: 2 MMOL/L (ref 3–14)
AST SERPL W P-5'-P-CCNC: 18 U/L (ref 0–45)
BASOPHILS # BLD AUTO: 0.1 10E3/UL (ref 0–0.2)
BASOPHILS NFR BLD AUTO: 1 %
BILIRUB SERPL-MCNC: 1.5 MG/DL (ref 0.2–1.3)
BUN SERPL-MCNC: 15 MG/DL (ref 7–30)
CALCIUM SERPL-MCNC: 9.5 MG/DL (ref 8.5–10.1)
CHLORIDE BLD-SCNC: 101 MMOL/L (ref 94–109)
CHOLEST SERPL-MCNC: 148 MG/DL
CO2 SERPL-SCNC: 33 MMOL/L (ref 20–32)
CREAT SERPL-MCNC: 0.8 MG/DL (ref 0.66–1.25)
EOSINOPHIL # BLD AUTO: 0.4 10E3/UL (ref 0–0.7)
EOSINOPHIL NFR BLD AUTO: 5 %
ERYTHROCYTE [DISTWIDTH] IN BLOOD BY AUTOMATED COUNT: 12.7 % (ref 10–15)
FASTING STATUS PATIENT QL REPORTED: YES
GFR SERPL CREATININE-BSD FRML MDRD: >90 ML/MIN/1.73M2
GLUCOSE BLD-MCNC: 104 MG/DL (ref 70–99)
HBA1C MFR BLD: 5.7 % (ref 0–5.6)
HCT VFR BLD AUTO: 53.1 % (ref 40–53)
HDLC SERPL-MCNC: 47 MG/DL
HGB BLD-MCNC: 17.2 G/DL (ref 13.3–17.7)
IMM GRANULOCYTES # BLD: 0 10E3/UL
IMM GRANULOCYTES NFR BLD: 0 %
LDLC SERPL CALC-MCNC: 79 MG/DL
LYMPHOCYTES # BLD AUTO: 2 10E3/UL (ref 0.8–5.3)
LYMPHOCYTES NFR BLD AUTO: 27 %
MCH RBC QN AUTO: 28 PG (ref 26.5–33)
MCHC RBC AUTO-ENTMCNC: 32.4 G/DL (ref 31.5–36.5)
MCV RBC AUTO: 87 FL (ref 78–100)
MONOCYTES # BLD AUTO: 0.6 10E3/UL (ref 0–1.3)
MONOCYTES NFR BLD AUTO: 8 %
NEUTROPHILS # BLD AUTO: 4.4 10E3/UL (ref 1.6–8.3)
NEUTROPHILS NFR BLD AUTO: 59 %
NONHDLC SERPL-MCNC: 101 MG/DL
PLATELET # BLD AUTO: 247 10E3/UL (ref 150–450)
POTASSIUM BLD-SCNC: 4.2 MMOL/L (ref 3.4–5.3)
PROT SERPL-MCNC: 7.5 G/DL (ref 6.8–8.8)
RBC # BLD AUTO: 6.14 10E6/UL (ref 4.4–5.9)
SODIUM SERPL-SCNC: 136 MMOL/L (ref 133–144)
TRIGL SERPL-MCNC: 112 MG/DL
WBC # BLD AUTO: 7.5 10E3/UL (ref 4–11)

## 2022-03-01 PROCEDURE — 83036 HEMOGLOBIN GLYCOSYLATED A1C: CPT | Performed by: FAMILY MEDICINE

## 2022-03-01 PROCEDURE — 82306 VITAMIN D 25 HYDROXY: CPT | Performed by: FAMILY MEDICINE

## 2022-03-01 PROCEDURE — 80061 LIPID PANEL: CPT | Performed by: FAMILY MEDICINE

## 2022-03-01 PROCEDURE — 99214 OFFICE O/P EST MOD 30 MIN: CPT | Performed by: FAMILY MEDICINE

## 2022-03-01 PROCEDURE — 80053 COMPREHEN METABOLIC PANEL: CPT | Performed by: FAMILY MEDICINE

## 2022-03-01 PROCEDURE — 36415 COLL VENOUS BLD VENIPUNCTURE: CPT | Performed by: FAMILY MEDICINE

## 2022-03-01 PROCEDURE — 85025 COMPLETE CBC W/AUTO DIFF WBC: CPT | Mod: QW | Performed by: FAMILY MEDICINE

## 2022-03-01 RX ORDER — LOVASTATIN 40 MG
40 TABLET ORAL DAILY
Qty: 90 TABLET | Refills: 1 | Status: SHIPPED | OUTPATIENT
Start: 2022-03-01 | End: 2022-10-03

## 2022-03-01 RX ORDER — LOVASTATIN 40 MG
40 TABLET ORAL DAILY
COMMUNITY
Start: 2022-02-22 | End: 2022-03-01

## 2022-03-01 RX ORDER — POTASSIUM CHLORIDE 1500 MG/1
20 TABLET, EXTENDED RELEASE ORAL DAILY
Qty: 90 TABLET | Refills: 1 | Status: SHIPPED | OUTPATIENT
Start: 2022-03-01 | End: 2022-10-12

## 2022-03-01 RX ORDER — LOSARTAN POTASSIUM 25 MG/1
25 TABLET ORAL DAILY
COMMUNITY
Start: 2022-02-22 | End: 2022-03-01

## 2022-03-01 RX ORDER — LOSARTAN POTASSIUM 25 MG/1
25 TABLET ORAL DAILY
Qty: 90 TABLET | Refills: 1 | Status: SHIPPED | OUTPATIENT
Start: 2022-03-01 | End: 2022-10-03

## 2022-03-01 RX ORDER — ATENOLOL AND CHLORTHALIDONE TABLET 100; 25 MG/1; MG/1
1 TABLET ORAL DAILY
Qty: 90 TABLET | Refills: 1 | Status: SHIPPED | OUTPATIENT
Start: 2022-03-01 | End: 2022-10-03

## 2022-03-01 RX ORDER — ATENOLOL AND CHLORTHALIDONE TABLET 100; 25 MG/1; MG/1
1 TABLET ORAL DAILY
COMMUNITY
Start: 2022-02-22 | End: 2022-03-01

## 2022-03-01 RX ORDER — POTASSIUM CHLORIDE 1500 MG/1
20 TABLET, EXTENDED RELEASE ORAL DAILY
COMMUNITY
Start: 2021-09-23 | End: 2022-03-01

## 2022-03-01 NOTE — PROGRESS NOTES
1. Secondary polycythemia  Labs have been stable we will repeat today  - CBC with Platelets & Differential; Future    2. Vitamin D deficiency  He is taking vitamin supplements will recheck  - Vitamin D Deficiency; Future    3. Impaired glucose tolerance  Continues to be in the prediabetic range for recheck  - Hemoglobin A1c; Future    4. Hypertension, unspecified type  Blood pressure under excellent control with meds as listed below  - Comprehensive metabolic panel; Future  - atenolol-chlorthalidone (TENORETIC) 100-25 MG tablet; Take 1 tablet by mouth daily  Dispense: 90 tablet; Refill: 1  - losartan (COZAAR) 25 MG tablet; Take 1 tablet (25 mg) by mouth daily  Dispense: 90 tablet; Refill: 1  - potassium chloride ER (K-TAB) 20 MEQ CR tablet; Take 1 tablet (20 mEq) by mouth daily  Dispense: 90 tablet; Refill: 1    5. Disorder of lipoprotein and lipid metabolism  Lipids under excellent control  - Lipid panel reflex to direct LDL Fasting; Future  - lovastatin (MEVACOR) 40 MG tablet; Take 1 tablet (40 mg) by mouth daily  Dispense: 90 tablet; Refill: 1    6. Nonalcoholic steatohepatitis (NUÑEZ)  We will recheck liver functions today  - Comprehensive metabolic panel; Future    Assessment & Plan   Problem List Items Addressed This Visit        Digestive    Nonalcoholic steatohepatitis (NUÑEZ)    Relevant Orders    Comprehensive metabolic panel    Vitamin D deficiency    Relevant Orders    Vitamin D Deficiency       Endocrine    Impaired glucose tolerance    Relevant Orders    Hemoglobin A1c    Disorder of lipoprotein and lipid metabolism    Relevant Medications    lovastatin (MEVACOR) 40 MG tablet    Other Relevant Orders    Lipid panel reflex to direct LDL Fasting       Circulatory    Hypertension    Relevant Medications    atenolol-chlorthalidone (TENORETIC) 100-25 MG tablet    losartan (COZAAR) 25 MG tablet    potassium chloride ER (K-TAB) 20 MEQ CR tablet    Other Relevant Orders    Comprehensive metabolic panel        "Hematologic    Secondary polycythemia - Primary    Relevant Orders    CBC with Platelets & Differential                  BMI:   Estimated body mass index is 43.38 kg/m  as calculated from the following:    Height as of 7/27/21: 1.803 m (5' 11\").    Weight as of this encounter: 141.1 kg (311 lb).             Otilio Moya MD  [facility]    Subjective   Mykel is a 71 year old who presents for the following health issues      Mykel is today for follow-up.  Overall he continues to do well.  His wife is in good health.  His three adult daughters are as well as his teenage granddaughter.  He has had no falls this winter.  Takes medications as directed.       Hypertension Follow-up      Do you check your blood pressure regularly outside of the clinic? No     Are you following a low salt diet? No    Are your blood pressures ever more than 140 on the top number (systolic) OR more   than 90 on the bottom number (diastolic), for example 140/90? Yes      How many servings of fruits and vegetables do you eat daily?  0-1    On average, how many sweetened beverages do you drink each day (Examples: soda, juice, sweet tea, etc.  Do NOT count diet or artificially sweetened beverages)?   0    How many days per week do you exercise enough to make your heart beat faster? 3 or less    How many minutes a day do you exercise enough to make your heart beat faster? 9 or less    How many days per week do you miss taking your medication? 0        Review of Systems   All other systems reviewed and are negative.           Objective    There were no vitals taken for this visit.  There is no height or weight on file to calculate BMI.  Physical Exam  Vitals and nursing note reviewed.   Constitutional:       Appearance: He is obese.   HENT:      Head: Normocephalic.   Neck:      Vascular: No carotid bruit.   Cardiovascular:      Rate and Rhythm: Normal rate and regular rhythm.      Pulses: Normal pulses. "   Pulmonary:      Effort: Pulmonary effort is normal.      Breath sounds: Normal breath sounds.   Abdominal:      Palpations: Abdomen is soft.   Musculoskeletal:         General: No swelling or tenderness.      Cervical back: Normal range of motion and neck supple.   Skin:     Findings: No rash.   Neurological:      General: No focal deficit present.      Mental Status: He is alert and oriented to person, place, and time.                \plain

## 2022-03-01 NOTE — LETTER
March 2, 2022      Mykel Elizabeth Jr.  [address] [address]        Dear ,    We are writing to inform you of your test results.    Your test results fall within the expected range(s) or remain unchanged from previous results.  Please continue with current treatment plan.    Resulted Orders   Hemoglobin A1c   Result Value Ref Range    Hemoglobin A1C 5.7 (H) 0.0 - 5.6 %      Comment:      Normal <5.7%   Prediabetes 5.7-6.4%    Diabetes 6.5% or higher     Note: Adopted from ADA consensus guidelines.   Vitamin D Deficiency   Result Value Ref Range    Vitamin D, Total (25-Hydroxy) 45 20 - 75 ug/L    Narrative    Season, race, dietary intake, and treatment affect the concentration of 25-hydroxy-Vitamin D. Values may decrease during winter months and increase during summer months. Values 20-29 ug/L may indicate Vitamin D insufficiency and values <20 ug/L may indicate Vitamin D deficiency.    Vitamin D determination is routinely performed by an immunoassay specific for 25 hydroxyvitamin D3.  If an individual is on vitamin D2(ergocalciferol) supplementation, please specify 25 OH vitamin D2 and D3 level determination by LCMSMS test VITD23.     Comprehensive metabolic panel   Result Value Ref Range    Sodium 136 133 - 144 mmol/L    Potassium 4.2 3.4 - 5.3 mmol/L    Chloride 101 94 - 109 mmol/L    Carbon Dioxide (CO2) 33 (H) 20 - 32 mmol/L    Anion Gap 2 (L) 3 - 14 mmol/L    Urea Nitrogen 15 7 - 30 mg/dL    Creatinine 0.80 0.66 - 1.25 mg/dL    Calcium 9.5 8.5 - 10.1 mg/dL    Glucose 104 (H) 70 - 99 mg/dL    Alkaline Phosphatase 47 40 - 150 U/L    AST 18 0 - 45 U/L    ALT 18 0 - 70 U/L    Protein Total 7.5 6.8 - 8.8 g/dL    Albumin 3.8 3.4 - 5.0 g/dL    Bilirubin Total 1.5 (H) 0.2 - 1.3 mg/dL    GFR Estimate >90 >60 mL/min/1.73m2      Comment:      Effective December 21, 2021 eGFRcr in adults is calculated using the 2021 CKD-EPI creatinine equation which includes age and gender ( et al.,  TANIA, DOI: 10.1056/MEAFab1432370)   Lipid panel reflex to direct LDL Fasting   Result Value Ref Range    Cholesterol 148 <200 mg/dL    Triglycerides 112 <150 mg/dL    Direct Measure HDL 47 >=40 mg/dL    LDL Cholesterol Calculated 79 <=100 mg/dL    Non HDL Cholesterol 101 <130 mg/dL    Patient Fasting > 8hrs? Yes     Narrative    Cholesterol  Desirable:  <200 mg/dL    Triglycerides  Normal:  Less than 150 mg/dL  Borderline High:  150-199 mg/dL  High:  200-499 mg/dL  Very High:  Greater than or equal to 500 mg/dL    Direct Measure HDL  Female:  Greater than or equal to 50 mg/dL   Male:  Greater than or equal to 40 mg/dL    LDL Cholesterol  Desirable:  <100mg/dL  Above Desirable:  100-129 mg/dL   Borderline High:  130-159 mg/dL   High:  160-189 mg/dL   Very High:  >= 190 mg/dL    Non HDL Cholesterol  Desirable:  130 mg/dL  Above Desirable:  130-159 mg/dL  Borderline High:  160-189 mg/dL  High:  190-219 mg/dL  Very High:  Greater than or equal to 220 mg/dL   CBC with platelets and differential   Result Value Ref Range    WBC Count 7.5 4.0 - 11.0 10e3/uL    RBC Count 6.14 (H) 4.40 - 5.90 10e6/uL    Hemoglobin 17.2 13.3 - 17.7 g/dL    Hematocrit 53.1 (H) 40.0 - 53.0 %    MCV 87 78 - 100 fL    MCH 28.0 26.5 - 33.0 pg    MCHC 32.4 31.5 - 36.5 g/dL    RDW 12.7 10.0 - 15.0 %    Platelet Count 247 150 - 450 10e3/uL    % Neutrophils 59 %    % Lymphocytes 27 %    % Monocytes 8 %    % Eosinophils 5 %    % Basophils 1 %    % Immature Granulocytes 0 %    Absolute Neutrophils 4.4 1.6 - 8.3 10e3/uL    Absolute Lymphocytes 2.0 0.8 - 5.3 10e3/uL    Absolute Monocytes 0.6 0.0 - 1.3 10e3/uL    Absolute Eosinophils 0.4 0.0 - 0.7 10e3/uL    Absolute Basophils 0.1 0.0 - 0.2 10e3/uL    Absolute Immature Granulocytes 0.0 <=0.4 10e3/uL       If you have any questions or concerns, please call the clinic at the number listed above.       Sincerely,      Otilio Moya MD

## 2022-03-01 NOTE — LETTER
March 1, 2022      Mykel Elizabeth .  [address] [facility] [address]        Dear ,    We are writing to inform you of your test results.    Your test results fall within the expected range(s) or remain unchanged from previous results.  Please continue with current treatment plan.    Resulted Orders   Hemoglobin A1c   Result Value Ref Range    Hemoglobin A1C 5.7 (H) 0.0 - 5.6 %      Comment:      Normal <5.7%   Prediabetes 5.7-6.4%    Diabetes 6.5% or higher     Note: Adopted from ADA consensus guidelines.   Comprehensive metabolic panel   Result Value Ref Range    Sodium 136 133 - 144 mmol/L    Potassium 4.2 3.4 - 5.3 mmol/L    Chloride 101 94 - 109 mmol/L    Carbon Dioxide (CO2) 33 (H) 20 - 32 mmol/L    Anion Gap 2 (L) 3 - 14 mmol/L    Urea Nitrogen 15 7 - 30 mg/dL    Creatinine 0.80 0.66 - 1.25 mg/dL    Calcium 9.5 8.5 - 10.1 mg/dL    Glucose 104 (H) 70 - 99 mg/dL    Alkaline Phosphatase 47 40 - 150 U/L    AST 18 0 - 45 U/L    ALT 18 0 - 70 U/L    Protein Total 7.5 6.8 - 8.8 g/dL    Albumin 3.8 3.4 - 5.0 g/dL    Bilirubin Total 1.5 (H) 0.2 - 1.3 mg/dL    GFR Estimate >90 >60 mL/min/1.73m2      Comment:      Effective December 21, 2021 eGFRcr in adults is calculated using the 2021 CKD-EPI creatinine equation which includes age and gender (Loyd et al., [place], DOI: 10.1056/HPWRze2522011)   Lipid panel reflex to direct LDL Fasting   Result Value Ref Range    Cholesterol 148 <200 mg/dL    Triglycerides 112 <150 mg/dL    Direct Measure HDL 47 >=40 mg/dL    LDL Cholesterol Calculated 79 <=100 mg/dL    Non HDL Cholesterol 101 <130 mg/dL    Patient Fasting > 8hrs? Yes     Narrative    Cholesterol  Desirable:  <200 mg/dL    Triglycerides  Normal:  Less than 150 mg/dL  Borderline High:  150-199 mg/dL  High:  200-499 mg/dL  Very High:  Greater than or equal to 500 mg/dL    Direct Measure HDL  Female:  Greater than or equal to 50 mg/dL   Male:  Greater than or equal to 40 mg/dL    LDL  Cholesterol  Desirable:  <100mg/dL  Above Desirable:  100-129 mg/dL   Borderline High:  130-159 mg/dL   High:  160-189 mg/dL   Very High:  >= 190 mg/dL    Non HDL Cholesterol  Desirable:  130 mg/dL  Above Desirable:  130-159 mg/dL  Borderline High:  160-189 mg/dL  High:  190-219 mg/dL  Very High:  Greater than or equal to 220 mg/dL   CBC with platelets and differential   Result Value Ref Range    WBC Count 7.5 4.0 - 11.0 10e3/uL    RBC Count 6.14 (H) 4.40 - 5.90 10e6/uL    Hemoglobin 17.2 13.3 - 17.7 g/dL    Hematocrit 53.1 (H) 40.0 - 53.0 %    MCV 87 78 - 100 fL    MCH 28.0 26.5 - 33.0 pg    MCHC 32.4 31.5 - 36.5 g/dL    RDW 12.7 10.0 - 15.0 %    Platelet Count 247 150 - 450 10e3/uL    % Neutrophils 59 %    % Lymphocytes 27 %    % Monocytes 8 %    % Eosinophils 5 %    % Basophils 1 %    % Immature Granulocytes 0 %    Absolute Neutrophils 4.4 1.6 - 8.3 10e3/uL    Absolute Lymphocytes 2.0 0.8 - 5.3 10e3/uL    Absolute Monocytes 0.6 0.0 - 1.3 10e3/uL    Absolute Eosinophils 0.4 0.0 - 0.7 10e3/uL    Absolute Basophils 0.1 0.0 - 0.2 10e3/uL    Absolute Immature Granulocytes 0.0 <=0.4 10e3/uL       If you have any questions or concerns, please call the clinic at the number listed above.       Sincerely,      Otilio Moya MD           Yes

## 2022-03-01 NOTE — LETTER
March 1, 2022      Mykel Elizabeth Jr.  [address] [facility] [address]        Dear ,    We are writing to inform you of your test results.    Your test results fall within the expected range(s) or remain unchanged from previous results.  Please continue with current treatment plan.    Resulted Orders   Hemoglobin A1c   Result Value Ref Range    Hemoglobin A1C 5.7 (H) 0.0 - 5.6 %      Comment:      Normal <5.7%   Prediabetes 5.7-6.4%    Diabetes 6.5% or higher     Note: Adopted from ADA consensus guidelines.   CBC with platelets and differential   Result Value Ref Range    WBC Count 7.5 4.0 - 11.0 10e3/uL    RBC Count 6.14 (H) 4.40 - 5.90 10e6/uL    Hemoglobin 17.2 13.3 - 17.7 g/dL    Hematocrit 53.1 (H) 40.0 - 53.0 %    MCV 87 78 - 100 fL    MCH 28.0 26.5 - 33.0 pg    MCHC 32.4 31.5 - 36.5 g/dL    RDW 12.7 10.0 - 15.0 %    Platelet Count 247 150 - 450 10e3/uL    % Neutrophils 59 %    % Lymphocytes 27 %    % Monocytes 8 %    % Eosinophils 5 %    % Basophils 1 %    % Immature Granulocytes 0 %    Absolute Neutrophils 4.4 1.6 - 8.3 10e3/uL    Absolute Lymphocytes 2.0 0.8 - 5.3 10e3/uL    Absolute Monocytes 0.6 0.0 - 1.3 10e3/uL    Absolute Eosinophils 0.4 0.0 - 0.7 10e3/uL    Absolute Basophils 0.1 0.0 - 0.2 10e3/uL    Absolute Immature Granulocytes 0.0 <=0.4 10e3/uL       If you have any questions or concerns, please call the clinic at the number listed above.       Sincerely,      Otilio Moya MD

## 2022-03-02 LAB — DEPRECATED CALCIDIOL+CALCIFEROL SERPL-MC: 45 UG/L (ref 20–75)

## 2022-03-02 NOTE — TELEPHONE ENCOUNTER
Entered by Justina Rojas MA on February 24, 2022 9:48:15 AM CST  ---------------------  From: Justina Rojas MA   To: [facility] #[id number]    Sent: 2/24/2022 9:48:15 AM CST  Subject: Medication Management     ** Not Approved: Patient needs appointment, Needs to be seen to get all meds refilled per TFS **  lovastatin (LOVASTATIN 40MG TABLETS)  TAKE 1 TABLET BY MOUTH DAILY  Qty:  90 tab(s)        Days Supply:  90        Refills:  0          Substitutions Allowed     Route To Pharmacy - [facility] #[id number]   Note from Pharmacy:  **Patient requests 90 days supply**  Signed by Justina Rojas MA            ------------------------------------------  From: [facility] #[id number]  To: Otilio Moya MD  Sent: February 22, 2022 11:58:13 AM CST  Subject: Medication Management  Due: February 19, 2022 12:48:33 PM CST     ** On Hold Pending Signature **     Dispensed Drug: lovastatin (lovastatin 40 mg oral tablet), TAKE 1 TABLET BY MOUTH DAILY  Quantity: 90 tab(s)  Days Supply: 90  Refills: 0  Substitutions Allowed  Notes from Pharmacy: **Patient requests 90 days supply**  ------------------------------------------

## 2022-03-02 NOTE — LETTER
(Inserted Image. Unable to display)   January 28, 2022  MARIAH FRANCOIS  [address] [place] [address]        Dear MARIAH,    Thank you for selecting [facility] for your healthcare needs.    Our records indicate you are due for the following services:     Hypertension check ~ Please remember to bring your at-home blood pressure readings with you to your appointment.     (FYI   Regarding office visits: In some instances, a video visit or telephone visit may be offered as an option.)    To schedule an appointment or if you have further questions, please contact your clinic at [phone].    Powered by [employer]    Sincerely,    Otilio Moya MD

## 2022-10-03 DIAGNOSIS — I10 HYPERTENSION, UNSPECIFIED TYPE: ICD-10-CM

## 2022-10-03 DIAGNOSIS — E78.9 DISORDER OF LIPOPROTEIN AND LIPID METABOLISM: ICD-10-CM

## 2022-10-03 RX ORDER — ATENOLOL AND CHLORTHALIDONE TABLET 100; 25 MG/1; MG/1
1 TABLET ORAL DAILY
Qty: 90 TABLET | Refills: 1 | Status: SHIPPED | OUTPATIENT
Start: 2022-10-03 | End: 2022-11-08

## 2022-10-03 RX ORDER — LOSARTAN POTASSIUM 25 MG/1
TABLET ORAL
Qty: 90 TABLET | Refills: 1 | Status: SHIPPED | OUTPATIENT
Start: 2022-10-03 | End: 2022-11-08

## 2022-10-03 RX ORDER — LOVASTATIN 40 MG
TABLET ORAL
Qty: 90 TABLET | Refills: 1 | Status: SHIPPED | OUTPATIENT
Start: 2022-10-03 | End: 2022-11-08

## 2022-10-03 NOTE — TELEPHONE ENCOUNTER
Last Written Prescription Date: 3/1/22  Last Fill Quantity: 90,  # refills: 1   Last office visit: 3/1/2022 with prescribing provider

## 2022-10-12 DIAGNOSIS — I10 HYPERTENSION, UNSPECIFIED TYPE: ICD-10-CM

## 2022-10-12 RX ORDER — POTASSIUM CHLORIDE 1500 MG/1
TABLET, EXTENDED RELEASE ORAL
Qty: 30 TABLET | Refills: 0 | Status: SHIPPED | OUTPATIENT
Start: 2022-10-12 | End: 2022-11-08

## 2022-10-12 NOTE — TELEPHONE ENCOUNTER
"TC- please contact patient. 30 day supply of medication sent to pharmacy. Patient is due for follow up visit.      Last Written Prescription Date:  3/1/22  Last Fill Quantity: 90,  # refills: 1   Last office visit: 3/1/2022   Future Office Visit:  Due for visit around 9/1/22            Requested Prescriptions   Pending Prescriptions Disp Refills     potassium chloride ER (K-TAB) 20 MEQ CR tablet [Pharmacy Med Name: POTASSIUM CHLORIDE 20MEQ ER TABLETS] 90 tablet 1     Sig: TAKE 1 TABLET(20 MEQ) BY MOUTH DAILY       Potassium Supplements Protocol Passed - 10/12/2022 11:48 AM        Passed - Recent (12 mo) or future (30 days) visit within the authorizing provider's department     Patient has had an office visit with the authorizing provider or a provider within the authorizing providers department within the previous 12 mos or has a future within next 30 days. See \"Patient Info\" tab in inbasket, or \"Choose Columns\" in Meds & Orders section of the refill encounter.              Passed - Medication is active on med list        Passed - Patient is age 18 or older        Passed - Normal serum potassium in past 12 months     Recent Labs   Lab Test 03/01/22  0846   POTASSIUM 4.2                         SERAFIN MICHELLE RN 10/12/22 3:39 PM    "

## 2022-11-08 ENCOUNTER — OFFICE VISIT (OUTPATIENT)
Dept: FAMILY MEDICINE | Facility: CLINIC | Age: 72
End: 2022-11-08
Payer: COMMERCIAL

## 2022-11-08 VITALS
WEIGHT: 285 LBS | TEMPERATURE: 97.2 F | BODY MASS INDEX: 39.9 KG/M2 | SYSTOLIC BLOOD PRESSURE: 120 MMHG | HEART RATE: 68 BPM | DIASTOLIC BLOOD PRESSURE: 82 MMHG | HEIGHT: 71 IN

## 2022-11-08 DIAGNOSIS — I77.79 CELIAC ARTERY DISSECTION (H): ICD-10-CM

## 2022-11-08 DIAGNOSIS — E78.9 DISORDER OF LIPOPROTEIN AND LIPID METABOLISM: ICD-10-CM

## 2022-11-08 DIAGNOSIS — Z23 NEED FOR VACCINATION: Primary | ICD-10-CM

## 2022-11-08 DIAGNOSIS — I71.20 THORACIC AORTIC ANEURYSM WITHOUT RUPTURE, UNSPECIFIED PART (H): ICD-10-CM

## 2022-11-08 DIAGNOSIS — I10 PRIMARY HYPERTENSION: ICD-10-CM

## 2022-11-08 DIAGNOSIS — K75.81 NONALCOHOLIC STEATOHEPATITIS (NASH): ICD-10-CM

## 2022-11-08 DIAGNOSIS — D75.1 SECONDARY POLYCYTHEMIA: ICD-10-CM

## 2022-11-08 DIAGNOSIS — G47.33 OBSTRUCTIVE SLEEP APNEA SYNDROME: ICD-10-CM

## 2022-11-08 DIAGNOSIS — E66.01 MORBID OBESITY (H): ICD-10-CM

## 2022-11-08 DIAGNOSIS — J30.2 SEASONAL ALLERGIC RHINITIS, UNSPECIFIED TRIGGER: ICD-10-CM

## 2022-11-08 DIAGNOSIS — E55.9 VITAMIN D DEFICIENCY: ICD-10-CM

## 2022-11-08 DIAGNOSIS — R73.02 IMPAIRED GLUCOSE TOLERANCE: ICD-10-CM

## 2022-11-08 DIAGNOSIS — C44.91 MALIGNANT BASAL CELL NEOPLASM OF SKIN: ICD-10-CM

## 2022-11-08 PROCEDURE — 91313 COVID-19,PF,MODERNA BIVALENT: CPT | Performed by: FAMILY MEDICINE

## 2022-11-08 PROCEDURE — 0134A COVID-19,PF,MODERNA BIVALENT: CPT | Performed by: FAMILY MEDICINE

## 2022-11-08 PROCEDURE — 99214 OFFICE O/P EST MOD 30 MIN: CPT | Performed by: FAMILY MEDICINE

## 2022-11-08 RX ORDER — LOSARTAN POTASSIUM 25 MG/1
25 TABLET ORAL DAILY
Qty: 90 TABLET | Refills: 1 | Status: SHIPPED | OUTPATIENT
Start: 2022-11-08 | End: 2023-03-29

## 2022-11-08 RX ORDER — ATENOLOL AND CHLORTHALIDONE TABLET 100; 25 MG/1; MG/1
1 TABLET ORAL DAILY
Qty: 90 TABLET | Refills: 1 | Status: SHIPPED | OUTPATIENT
Start: 2022-11-08 | End: 2023-03-29

## 2022-11-08 RX ORDER — POTASSIUM CHLORIDE 1500 MG/1
TABLET, EXTENDED RELEASE ORAL
Qty: 90 TABLET | Refills: 1 | Status: SHIPPED | OUTPATIENT
Start: 2022-11-08 | End: 2023-03-29

## 2022-11-08 RX ORDER — LOVASTATIN 40 MG
40 TABLET ORAL DAILY
Qty: 90 TABLET | Refills: 1 | Status: SHIPPED | OUTPATIENT
Start: 2022-11-08 | End: 2023-03-29

## 2022-11-08 NOTE — PROGRESS NOTES
"  Assessment & Plan     Primary hypertension  Under excellent control with atenolol chlorthalidone and losartan    Secondary polycythemia  Chronic and stable up-to-date on labs    Nonalcoholic steatohepatitis (NUÑEZ)  No changes    Obstructive sleep apnea syndrome  Uses CPAP    Thoracic aortic aneurysm without rupture, unspecified part  Due for CT  - CTA Chest with Contrast; Future    Impaired glucose tolerance  A1c's are stable    Vitamin D deficiency  Take supplement    Morbid obesity (H)  No change    Malignant basal cell neoplasm of skin  Followed by dermatology    Seasonal allergic rhinitis, unspecified trigger  Unchanged    Disorder of lipoprotein and lipid metabolism  On lovastatin  - lovastatin (MEVACOR) 40 MG tablet; Take 1 tablet (40 mg) by mouth daily    Celiac artery dissection (H)  Due for CT  - CTA Abdomen Pelvis with Contrast; Future    Need for vaccination    - COVID-19,PF,MODERNA BIVALENT 18+Yrs             BMI:   Estimated body mass index is 39.75 kg/m  as calculated from the following:    Height as of this encounter: 1.803 m (5' 11\").    Weight as of this encounter: 129.3 kg (285 lb).           Return in about 6 months (around 5/8/2023) for Follow up.    Otilio Moya MD  [facility]    Priyanka Hogue is a 72 year old, presenting for the following health issues: Overall doing well continues to live independently with his wife.  Retired 9 years ago.  Hypertension and Refill Request      HPI     HTN f/u and refills. Does not check BP at home very often.      Patient Active Problem List   Diagnosis     Morbid obesity (H)     Nonalcoholic steatohepatitis (NUÑEZ)     Obstructive sleep apnea syndrome     Impaired glucose tolerance     Primary hypertension     Disorder of lipoprotein and lipid metabolism     Thoracic aortic aneurysm (TAA)     Secondary polycythemia     Vitamin D deficiency     Seasonal allergic rhinitis     Malignant basal cell neoplasm of skin     " "Celiac artery dissection (H)     Current Outpatient Medications   Medication     atenolol-chlorthalidone (TENORETIC) 100-25 MG tablet     losartan (COZAAR) 25 MG tablet     lovastatin (MEVACOR) 40 MG tablet     Multiple Vitamins-Minerals (MULTIVITAL-M PO)     potassium chloride ER (K-TAB) 20 MEQ CR tablet     No current facility-administered medications for this visit.         Review of Systems   Constitutional, HEENT, cardiovascular, pulmonary, gi and gu systems are negative, except as otherwise noted.      Objective    /82 (BP Location: Right arm, Patient Position: Sitting, Cuff Size: Adult Large)   Pulse 68   Temp 97.2  F (36.2  C) (Temporal)   Ht 1.803 m (5' 11\")   Wt 129.3 kg (285 lb)   BMI 39.75 kg/m    Body mass index is 39.75 kg/m .  Physical Exam   GENERAL: healthy, alert and no distress  NECK: no adenopathy, no asymmetry, masses, or scars and thyroid normal to palpation  RESP: lungs clear to auscultation - no rales, rhonchi or wheezes  CV: regular rate and rhythm, normal S1 S2, no S3 or S4, no murmur, click or rub, no peripheral edema and peripheral pulses strong  ABDOMEN: soft, nontender, no hepatosplenomegaly, no masses and bowel sounds normal  MS: no gross musculoskeletal defects noted, no edema    Office Visit on 03/01/2022   Component Date Value Ref Range Status     Hemoglobin A1C 03/01/2022 5.7 (H)  0.0 - 5.6 % Final    Normal <5.7%   Prediabetes 5.7-6.4%    Diabetes 6.5% or higher     Note: Adopted from ADA consensus guidelines.     Vitamin D, Total (25-Hydroxy) 03/01/2022 45  20 - 75 ug/L Final     Sodium 03/01/2022 136  133 - 144 mmol/L Final     Potassium 03/01/2022 4.2  3.4 - 5.3 mmol/L Final     Chloride 03/01/2022 101  94 - 109 mmol/L Final     Carbon Dioxide (CO2) 03/01/2022 33 (H)  20 - 32 mmol/L Final     Anion Gap 03/01/2022 2 (L)  3 - 14 mmol/L Final     Urea Nitrogen 03/01/2022 15  7 - 30 mg/dL Final     Creatinine 03/01/2022 0.80  0.66 - 1.25 mg/dL Final     Calcium " 03/01/2022 9.5  8.5 - 10.1 mg/dL Final     Glucose 03/01/2022 104 (H)  70 - 99 mg/dL Final     Alkaline Phosphatase 03/01/2022 47  40 - 150 U/L Final     AST 03/01/2022 18  0 - 45 U/L Final     ALT 03/01/2022 18  0 - 70 U/L Final     Protein Total 03/01/2022 7.5  6.8 - 8.8 g/dL Final     Albumin 03/01/2022 3.8  3.4 - 5.0 g/dL Final     Bilirubin Total 03/01/2022 1.5 (H)  0.2 - 1.3 mg/dL Final     GFR Estimate 03/01/2022 >90  >60 mL/min/1.73m2 Final    Effective December 21, 2021 eGFRcr in adults is calculated using the 2021 CKD-EPI creatinine equation which includes age and gender (Loyd et al., [place], DOI: 10.1056/XRMZva4941931)     Cholesterol 03/01/2022 148  <200 mg/dL Final     Triglycerides 03/01/2022 112  <150 mg/dL Final     Direct Measure HDL 03/01/2022 47  >=40 mg/dL Final     LDL Cholesterol Calculated 03/01/2022 79  <=100 mg/dL Final     Non HDL Cholesterol 03/01/2022 101  <130 mg/dL Final     Patient Fasting > 8hrs? 03/01/2022 Yes   Final     WBC Count 03/01/2022 7.5  4.0 - 11.0 10e3/uL Final     RBC Count 03/01/2022 6.14 (H)  4.40 - 5.90 10e6/uL Final     Hemoglobin 03/01/2022 17.2  13.3 - 17.7 g/dL Final     Hematocrit 03/01/2022 53.1 (H)  40.0 - 53.0 % Final     MCV 03/01/2022 87  78 - 100 fL Final     MCH 03/01/2022 28.0  26.5 - 33.0 pg Final     MCHC 03/01/2022 32.4  31.5 - 36.5 g/dL Final     RDW 03/01/2022 12.7  10.0 - 15.0 % Final     Platelet Count 03/01/2022 247  150 - 450 10e3/uL Final     % Neutrophils 03/01/2022 59  % Final     % Lymphocytes 03/01/2022 27  % Final     % Monocytes 03/01/2022 8  % Final     % Eosinophils 03/01/2022 5  % Final     % Basophils 03/01/2022 1  % Final     % Immature Granulocytes 03/01/2022 0  % Final     Absolute Neutrophils 03/01/2022 4.4  1.6 - 8.3 10e3/uL Final     Absolute Lymphocytes 03/01/2022 2.0  0.8 - 5.3 10e3/uL Final     Absolute Monocytes 03/01/2022 0.6  0.0 - 1.3 10e3/uL Final     Absolute Eosinophils 03/01/2022 0.4  0.0 - 0.7 10e3/uL Final     Absolute  Basophils 03/01/2022 0.1  0.0 - 0.2 10e3/uL Final     Absolute Immature Granulocytes 03/01/2022 0.0  <=0.4 10e3/uL Final

## 2023-03-29 DIAGNOSIS — I10 PRIMARY HYPERTENSION: Primary | ICD-10-CM

## 2023-03-29 DIAGNOSIS — E78.9 DISORDER OF LIPOPROTEIN AND LIPID METABOLISM: ICD-10-CM

## 2023-03-29 RX ORDER — ATENOLOL AND CHLORTHALIDONE TABLET 100; 25 MG/1; MG/1
TABLET ORAL
Qty: 90 TABLET | Refills: 1 | Status: SHIPPED | OUTPATIENT
Start: 2023-03-29 | End: 2023-07-28

## 2023-03-29 RX ORDER — POTASSIUM CHLORIDE 1500 MG/1
TABLET, EXTENDED RELEASE ORAL
Qty: 90 TABLET | Refills: 1 | Status: SHIPPED | OUTPATIENT
Start: 2023-03-29 | End: 2023-07-28

## 2023-03-29 RX ORDER — LOVASTATIN 40 MG
TABLET ORAL
Qty: 90 TABLET | Refills: 1 | Status: SHIPPED | OUTPATIENT
Start: 2023-03-29 | End: 2023-07-28

## 2023-03-29 RX ORDER — LOSARTAN POTASSIUM 25 MG/1
TABLET ORAL
Qty: 90 TABLET | Refills: 1 | Status: SHIPPED | OUTPATIENT
Start: 2023-03-29 | End: 2023-07-28

## 2023-03-29 NOTE — TELEPHONE ENCOUNTER
Routing refill request to provider for review/approval because:  LOV 11/08/2022    None of the meds pass [facility] protocol     MELYSSA Rodriguez [facility]

## 2023-08-02 ENCOUNTER — OFFICE VISIT (OUTPATIENT)
Dept: FAMILY MEDICINE | Facility: CLINIC | Age: 73
End: 2023-08-02
Payer: COMMERCIAL

## 2023-08-02 VITALS
SYSTOLIC BLOOD PRESSURE: 126 MMHG | RESPIRATION RATE: 20 BRPM | HEIGHT: 71 IN | OXYGEN SATURATION: 98 % | BODY MASS INDEX: 42 KG/M2 | TEMPERATURE: 97.1 F | DIASTOLIC BLOOD PRESSURE: 86 MMHG | HEART RATE: 71 BPM | WEIGHT: 300 LBS

## 2023-08-02 DIAGNOSIS — E55.9 VITAMIN D DEFICIENCY: ICD-10-CM

## 2023-08-02 DIAGNOSIS — I10 PRIMARY HYPERTENSION: Primary | ICD-10-CM

## 2023-08-02 DIAGNOSIS — E78.9 DISORDER OF LIPOPROTEIN AND LIPID METABOLISM: ICD-10-CM

## 2023-08-02 DIAGNOSIS — R73.09 ELEVATED GLUCOSE: ICD-10-CM

## 2023-08-02 DIAGNOSIS — K75.81 NONALCOHOLIC STEATOHEPATITIS (NASH): ICD-10-CM

## 2023-08-02 DIAGNOSIS — D75.1 SECONDARY POLYCYTHEMIA: ICD-10-CM

## 2023-08-02 DIAGNOSIS — I77.79 CELIAC ARTERY DISSECTION (H): ICD-10-CM

## 2023-08-02 DIAGNOSIS — G47.33 OBSTRUCTIVE SLEEP APNEA SYNDROME: ICD-10-CM

## 2023-08-02 DIAGNOSIS — I71.20 THORACIC AORTIC ANEURYSM WITHOUT RUPTURE, UNSPECIFIED PART (H): ICD-10-CM

## 2023-08-02 DIAGNOSIS — Z00.00 ENCOUNTER FOR MEDICARE ANNUAL WELLNESS EXAM: ICD-10-CM

## 2023-08-02 LAB
ANION GAP SERPL CALCULATED.3IONS-SCNC: 11 MMOL/L (ref 7–15)
BASOPHILS # BLD AUTO: 0 10E3/UL (ref 0–0.2)
BASOPHILS NFR BLD AUTO: 0 %
BUN SERPL-MCNC: 10.8 MG/DL (ref 8–23)
CALCIUM SERPL-MCNC: 9.6 MG/DL (ref 8.8–10.2)
CHLORIDE SERPL-SCNC: 98 MMOL/L (ref 98–107)
CHOLEST SERPL-MCNC: 178 MG/DL
CREAT SERPL-MCNC: 0.8 MG/DL (ref 0.67–1.17)
DEPRECATED HCO3 PLAS-SCNC: 28 MMOL/L (ref 22–29)
EOSINOPHIL # BLD AUTO: 0.5 10E3/UL (ref 0–0.7)
EOSINOPHIL NFR BLD AUTO: 6 %
ERYTHROCYTE [DISTWIDTH] IN BLOOD BY AUTOMATED COUNT: 13.5 % (ref 10–15)
GFR SERPL CREATININE-BSD FRML MDRD: >90 ML/MIN/1.73M2
GLUCOSE SERPL-MCNC: 110 MG/DL (ref 70–99)
HBA1C MFR BLD: 5.5 % (ref 0–5.6)
HCT VFR BLD AUTO: 48.5 % (ref 40–53)
HDLC SERPL-MCNC: 58 MG/DL
HGB BLD-MCNC: 16.8 G/DL (ref 13.3–17.7)
IMM GRANULOCYTES # BLD: 0 10E3/UL
IMM GRANULOCYTES NFR BLD: 0 %
LDLC SERPL CALC-MCNC: 88 MG/DL
LYMPHOCYTES # BLD AUTO: 2.5 10E3/UL (ref 0.8–5.3)
LYMPHOCYTES NFR BLD AUTO: 31 %
MCH RBC QN AUTO: 30.1 PG (ref 26.5–33)
MCHC RBC AUTO-ENTMCNC: 34.6 G/DL (ref 31.5–36.5)
MCV RBC AUTO: 87 FL (ref 78–100)
MONOCYTES # BLD AUTO: 0.8 10E3/UL (ref 0–1.3)
MONOCYTES NFR BLD AUTO: 10 %
NEUTROPHILS # BLD AUTO: 4.3 10E3/UL (ref 1.6–8.3)
NEUTROPHILS NFR BLD AUTO: 52 %
NONHDLC SERPL-MCNC: 120 MG/DL
PLATELET # BLD AUTO: 214 10E3/UL (ref 150–450)
POTASSIUM SERPL-SCNC: 3.7 MMOL/L (ref 3.4–5.3)
RBC # BLD AUTO: 5.58 10E6/UL (ref 4.4–5.9)
SODIUM SERPL-SCNC: 137 MMOL/L (ref 136–145)
TRIGL SERPL-MCNC: 158 MG/DL
WBC # BLD AUTO: 8.1 10E3/UL (ref 4–11)

## 2023-08-02 PROCEDURE — 85025 COMPLETE CBC W/AUTO DIFF WBC: CPT | Mod: QW | Performed by: FAMILY MEDICINE

## 2023-08-02 PROCEDURE — 80048 BASIC METABOLIC PNL TOTAL CA: CPT | Performed by: FAMILY MEDICINE

## 2023-08-02 PROCEDURE — 80061 LIPID PANEL: CPT | Performed by: FAMILY MEDICINE

## 2023-08-02 PROCEDURE — 99214 OFFICE O/P EST MOD 30 MIN: CPT | Mod: 25 | Performed by: FAMILY MEDICINE

## 2023-08-02 PROCEDURE — 36415 COLL VENOUS BLD VENIPUNCTURE: CPT | Performed by: FAMILY MEDICINE

## 2023-08-02 PROCEDURE — 83036 HEMOGLOBIN GLYCOSYLATED A1C: CPT | Performed by: FAMILY MEDICINE

## 2023-08-02 PROCEDURE — G0439 PPPS, SUBSEQ VISIT: HCPCS | Performed by: FAMILY MEDICINE

## 2023-08-02 RX ORDER — POTASSIUM CHLORIDE 1500 MG/1
TABLET, EXTENDED RELEASE ORAL
Qty: 90 TABLET | Refills: 2 | Status: SHIPPED | OUTPATIENT
Start: 2023-08-02 | End: 2024-09-06

## 2023-08-02 RX ORDER — ATENOLOL AND CHLORTHALIDONE TABLET 100; 25 MG/1; MG/1
1 TABLET ORAL DAILY
Qty: 90 TABLET | Refills: 2 | Status: SHIPPED | OUTPATIENT
Start: 2023-08-02 | End: 2024-09-06

## 2023-08-02 RX ORDER — LOVASTATIN 40 MG
TABLET ORAL
Qty: 90 TABLET | Refills: 2 | Status: SHIPPED | OUTPATIENT
Start: 2023-08-02 | End: 2024-09-06

## 2023-08-02 RX ORDER — LOSARTAN POTASSIUM 25 MG/1
TABLET ORAL
Qty: 90 TABLET | Refills: 2 | Status: SHIPPED | OUTPATIENT
Start: 2023-08-02 | End: 2024-09-06

## 2023-08-02 ASSESSMENT — ENCOUNTER SYMPTOMS
JOINT SWELLING: 0
WEAKNESS: 0
HEMATURIA: 0
DIARRHEA: 0
EYE PAIN: 0
ARTHRALGIAS: 0
HEARTBURN: 0
MYALGIAS: 0
CONSTIPATION: 0
ABDOMINAL PAIN: 0
HEADACHES: 0
DYSURIA: 0
PARESTHESIAS: 0
NERVOUS/ANXIOUS: 0
SORE THROAT: 0
COUGH: 0
HEMATOCHEZIA: 0
FREQUENCY: 0
PALPITATIONS: 0
SHORTNESS OF BREATH: 0
CHILLS: 0
FEVER: 0
NAUSEA: 0
DIZZINESS: 0

## 2023-08-02 ASSESSMENT — ACTIVITIES OF DAILY LIVING (ADL): CURRENT_FUNCTION: NO ASSISTANCE NEEDED

## 2023-08-02 NOTE — PROGRESS NOTES
"The patient was provided with written information regarding signs of hearing loss.Answers submitted by the patient for this visit:  Annual Preventive Visit (Submitted on 8/2/2023)  Chief Complaint: Annual Exam:  In general, how would you rate your overall physical health?: good  Frequency of exercise:: 2-3 days/week  Do you usually eat at least 4 servings of fruit and vegetables a day, include whole grains & fiber, and avoid regularly eating high fat or \"junk\" foods? : Yes  Taking medications regularly:: Yes  Medication side effects:: None  Activities of Daily Living: no assistance needed  Home safety: no safety concerns identified  Hearing Impairment:: difficulty following a conversation in a noisy restaurant or crowded room  In the past 6 months, have you been bothered by leaking of urine?: No  abdominal pain: No  Blood in stool: No  Blood in urine: No  chest pain: No  chills: No  congestion: No  constipation: No  cough: No  diarrhea: No  dizziness: No  ear pain: No  eye pain: No  nervous/anxious: No  fever: No  frequency: No  genital sores: No  headaches: No  hearing loss: No  heartburn: No  arthralgias: No  joint swelling: No  peripheral edema: No  mood changes: No  myalgias: No  nausea: No  dysuria: No  palpitations: No  Skin sensation changes: No  sore throat: No  urgency: No  rash: No  shortness of breath: No  visual disturbance: No  weakness: No  impotence: No  penile discharge: No  In general, how would you rate your overall mental or emotional health?: good  Additional concerns today:: No  Exercise outside of work (Submitted on 8/2/2023)  Chief Complaint: Annual Exam:  Duration of exercise:: Less than 15 minutes    "

## 2023-08-02 NOTE — PROGRESS NOTES
"SUBJECTIVE:   Mykel is a 72 year old who presents for Preventive Visit.  Overall doing well continues to live independently.  Most things are on 1 level at his house.  Family is a good health he has no health concerns.      8/2/2023     9:02 AM   Additional Questions   Roomed by Pia       Are you in the first 12 months of your Medicare coverage?  No    Healthy Habits:     In general, how would you rate your overall health?  Good    Frequency of exercise:  2-3 days/week    Duration of exercise:  Less than 15 minutes    Do you usually eat at least 4 servings of fruit and vegetables a day, include whole grains    & fiber and avoid regularly eating high fat or \"junk\" foods?  Yes    Taking medications regularly:  Yes    Medication side effects:  None    Ability to successfully perform activities of daily living:  No assistance needed    Home Safety:  No safety concerns identified    Hearing Impairment:  Difficulty following a conversation in a noisy restaurant or crowded room    In the past 6 months, have you been bothered by leaking of urine?  No    In general, how would you rate your overall mental or emotional health?  Good    Additional concerns today:  No        Have you ever done Advance Care Planning? (For example, a Health Directive, POLST, or a discussion with a medical provider or your loved ones about your wishes): Yes, patient states has an Advance Care Planning document and will bring a copy to the clinic.     Pt declines hearing screen today.    Fall risk  Fallen 2 or more times in the past year?: No  Any fall with injury in the past year?: No    Cognitive Screening   1) Repeat 3 items (Leader, Season, Table)    2) Clock draw: NORMAL  3) 3 item recall: Recalls 3 objects  Results: 3 items recalled: COGNITIVE IMPAIRMENT LESS LIKELY    Mini-CogTM Copyright WAYNE Harley. Licensed by the author for use in [facility]; reprinted with permission (bry@.[facility]). All rights reserved.          Reviewed " and updated as needed this visit by clinical staff   Tobacco  Allergies  Meds              Reviewed and updated as needed this visit by Provider                 Social History     Tobacco Use    Smoking status: Never    Smokeless tobacco: Never   Substance Use Topics    Alcohol use: Yes     Comment: 4 drinks per week             8/2/2023     8:59 AM   Alcohol Use   Prescreen: >3 drinks/day or >7 drinks/week? No     Do you have a current opioid prescription? No  Do you use any other controlled substances or medications that are not prescribed by a provider? None        Current providers sharing in care for this patient include:   Patient Care Team:  Otilio Moya MD as PCP - General (Family Medicine)  Otilio Moya MD as Assigned PCP    The following health maintenance items are reviewed in Epic and correct as of today:  Health Maintenance   Topic Date Due    ADVANCE CARE PLANNING  Never done    MEDICARE ANNUAL WELLNESS VISIT  07/27/2022    COVID-19 Vaccine (6 - Pfizer series) 03/08/2023    INFLUENZA VACCINE (1) 09/01/2023    ANNUAL REVIEW OF HM ORDERS  11/08/2023    FALL RISK ASSESSMENT  08/02/2024    LIPID  03/01/2027    COLORECTAL CANCER SCREENING  10/04/2028    DTAP/TDAP/TD IMMUNIZATION (4 - Td or Tdap) 12/14/2028    PHQ-2 (once per calendar year)  Completed    Pneumococcal Vaccine: 65+ Years  Completed    ZOSTER IMMUNIZATION  Completed    AORTIC ANEURYSM SCREENING (SYSTEM ASSIGNED)  Completed    IPV IMMUNIZATION  Aged Out    MENINGITIS IMMUNIZATION  Aged Out    HEPATITIS C SCREENING  Discontinued       Patient Active Problem List   Diagnosis    Morbid obesity (H)    Nonalcoholic steatohepatitis (NUÑEZ)    Obstructive sleep apnea syndrome    Impaired glucose tolerance    Primary hypertension    Disorder of lipoprotein and lipid metabolism    Thoracic aortic aneurysm (TAA) (H)    Secondary polycythemia    Vitamin D deficiency    Seasonal allergic rhinitis    Malignant basal cell neoplasm of skin     "Celiac artery dissection (H)     Current Outpatient Medications   Medication    atenolol-chlorthalidone (TENORETIC) 100-25 MG tablet    losartan (COZAAR) 25 MG tablet    lovastatin (MEVACOR) 40 MG tablet    Multiple Vitamins-Minerals (MULTIVITAL-M PO)    potassium chloride ER (KLOR-CON M) 20 MEQ CR tablet     No current facility-administered medications for this visit.             Review of Systems   Constitutional:  Negative for chills and fever.   HENT:  Negative for congestion, ear pain, hearing loss and sore throat.    Eyes:  Negative for pain and visual disturbance.   Respiratory:  Negative for cough and shortness of breath.    Cardiovascular:  Negative for chest pain, palpitations and peripheral edema.   Gastrointestinal:  Negative for abdominal pain, constipation, diarrhea, heartburn, hematochezia and nausea.   Genitourinary:  Negative for dysuria, frequency, genital sores, hematuria, impotence, penile discharge and urgency.   Musculoskeletal:  Negative for arthralgias, joint swelling and myalgias.   Skin:  Negative for rash.   Neurological:  Negative for dizziness, weakness, headaches and paresthesias.   Psychiatric/Behavioral:  Negative for mood changes. The patient is not nervous/anxious.          OBJECTIVE:   /86 (BP Location: Right arm, Patient Position: Sitting, Cuff Size: Adult Large)   Pulse 71   Temp 97.1  F (36.2  C) (Temporal)   Resp 20   Ht 1.803 m (5' 11\")   Wt 136.1 kg (300 lb)   SpO2 98%   BMI 41.84 kg/m   Estimated body mass index is 41.84 kg/m  as calculated from the following:    Height as of this encounter: 1.803 m (5' 11\").    Weight as of this encounter: 136.1 kg (300 lb).  Physical Exam  GENERAL: healthy, alert and no distress  NECK: no adenopathy, no asymmetry, masses, or scars and thyroid normal to palpation  RESP: lungs clear to auscultation - no rales, rhonchi or wheezes  CV: regular rate and rhythm, normal S1 S2, no S3 or S4, no murmur, click or rub, no peripheral edema " "and peripheral pulses strong  ABDOMEN: soft, nontender, no hepatosplenomegaly, no masses and bowel sounds normal  MS: no gross musculoskeletal defects noted, no edema        ASSESSMENT / PLAN:   (I10) Primary hypertension  (primary encounter diagnosis)  Comment: Under good control with meds as listed below  Plan: atenolol-chlorthalidone (TENORETIC) 100-25 MG         tablet, losartan (COZAAR) 25 MG tablet,         potassium chloride ER (KLOR-CON M) 20 MEQ CR         tablet, Basic metabolic panel            (E78.9) Disorder of lipoprotein and lipid metabolism  Comment: Good control with lovastatin  Plan: lovastatin (MEVACOR) 40 MG tablet, Lipid panel         reflex to direct LDL Fasting            (R73.09) Elevated glucose  Comment: A1c's have been stable  Plan: Hemoglobin A1c            (D75.1) Secondary polycythemia  Comment: Unchanged  Plan: CBC with Platelets & Differential            (I71.20) Thoracic aortic aneurysm without rupture, unspecified part (H)  Comment: He declines any further work-up understands risk  Plan:     (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)  Comment: Denies any further work-up understands risk  Plan:     (G47.33) Obstructive sleep apnea syndrome  Comment: Declines using CPAP  Plan:     (Z00.00) Encounter for Medicare annual wellness exam  Comment: Healthcare maintenance reviewed  Plan:     (E55.9) Vitamin D deficiency  Comment: On supplements  Plan:     (K75.81) Nonalcoholic steatohepatitis (NUÑEZ)  Comment: Chronic and stable  Plan:               COUNSELING:  Reviewed preventive health counseling, as reflected in patient instructions      BMI:   Estimated body mass index is 41.84 kg/m  as calculated from the following:    Height as of this encounter: 1.803 m (5' 11\").    Weight as of this encounter: 136.1 kg (300 lb).         He reports that he has never smoked. He has never used smokeless tobacco.      Appropriate preventive services were discussed with this patient, including applicable " screening as appropriate for cardiovascular disease, diabetes, osteopenia/osteoporosis, and glaucoma.  As appropriate for age/gender, discussed screening for colorectal cancer, prostate cancer, breast cancer, and cervical cancer. Checklist reviewing preventive services available has been given to the patient.    Reviewed patients plan of care and provided an AVS. The Basic Care Plan (routine screening as documented in Health Maintenance) for Mykel meets the Care Plan requirement. This Care Plan has been established and reviewed with the Patient.          Otilio Moya MD  [facility]    Identified Health Risks:

## 2023-08-02 NOTE — LETTER
August 2, 2023      Mykel Elizabeth Jr.  [address] [address]        Dear ,    We are writing to inform you of your test results.    Your test results fall within the expected range(s) or remain unchanged from previous results.  Please continue with current treatment plan.    Resulted Orders   Basic metabolic panel   Result Value Ref Range    Sodium 137 136 - 145 mmol/L    Potassium 3.7 3.4 - 5.3 mmol/L    Chloride 98 98 - 107 mmol/L    Carbon Dioxide (CO2) 28 22 - 29 mmol/L    Anion Gap 11 7 - 15 mmol/L    Urea Nitrogen 10.8 8.0 - 23.0 mg/dL    Creatinine 0.80 0.67 - 1.17 mg/dL    Calcium 9.6 8.8 - 10.2 mg/dL    Glucose 110 (H) 70 - 99 mg/dL    GFR Estimate >90 >60 mL/min/1.73m2   Hemoglobin A1c   Result Value Ref Range    Hemoglobin A1C 5.5 0.0 - 5.6 %      Comment:      Normal <5.7%   Prediabetes 5.7-6.4%    Diabetes 6.5% or higher     Note: Adopted from ADA consensus guidelines.   Lipid panel reflex to direct LDL Fasting   Result Value Ref Range    Cholesterol 178 <200 mg/dL    Triglycerides 158 (H) <150 mg/dL    Direct Measure HDL 58 >=40 mg/dL    LDL Cholesterol Calculated 88 <=100 mg/dL    Non HDL Cholesterol 120 <130 mg/dL    Narrative    Cholesterol  Desirable:  <200 mg/dL    Triglycerides  Normal:  Less than 150 mg/dL  Borderline High:  150-199 mg/dL  High:  200-499 mg/dL  Very High:  Greater than or equal to 500 mg/dL    Direct Measure HDL  Female:  Greater than or equal to 50 mg/dL   Male:  Greater than or equal to 40 mg/dL    LDL Cholesterol  Desirable:  <100mg/dL  Above Desirable:  100-129 mg/dL   Borderline High:  130-159 mg/dL   High:  160-189 mg/dL   Very High:  >= 190 mg/dL    Non HDL Cholesterol  Desirable:  130 mg/dL  Above Desirable:  130-159 mg/dL  Borderline High:  160-189 mg/dL  High:  190-219 mg/dL  Very High:  Greater than or equal to 220 mg/dL   CBC with platelets and differential   Result Value Ref Range    WBC Count 8.1 4.0 - 11.0 10e3/uL    RBC Count  5.58 4.40 - 5.90 10e6/uL    Hemoglobin 16.8 13.3 - 17.7 g/dL    Hematocrit 48.5 40.0 - 53.0 %    MCV 87 78 - 100 fL    MCH 30.1 26.5 - 33.0 pg    MCHC 34.6 31.5 - 36.5 g/dL    RDW 13.5 10.0 - 15.0 %    Platelet Count 214 150 - 450 10e3/uL    % Neutrophils 52 %    % Lymphocytes 31 %    % Monocytes 10 %    % Eosinophils 6 %    % Basophils 0 %    % Immature Granulocytes 0 %    Absolute Neutrophils 4.3 1.6 - 8.3 10e3/uL    Absolute Lymphocytes 2.5 0.8 - 5.3 10e3/uL    Absolute Monocytes 0.8 0.0 - 1.3 10e3/uL    Absolute Eosinophils 0.5 0.0 - 0.7 10e3/uL    Absolute Basophils 0.0 0.0 - 0.2 10e3/uL    Absolute Immature Granulocytes 0.0 <=0.4 10e3/uL       If you have any questions or concerns, please call the clinic at the number listed above.       Sincerely,      Otilio Moya MD

## 2023-08-02 NOTE — PATIENT INSTRUCTIONS
Patient Education   Personalized Prevention Plan  You are due for the preventive services outlined below.  Your care team is available to assist you in scheduling these services.  If you have already completed any of these items, please share that information with your care team to update in your medical record.  Health Maintenance Due   Topic Date Due     Annual Wellness Visit  07/27/2022     COVID-19 Vaccine (6 - Pfizer series) 03/08/2023     Hearing Loss: Care Instructions  Overview     Hearing loss is a sudden or slow decrease in how well you hear. It can range from slight to profound. Permanent hearing loss can occur with aging. It also can happen when you are exposed long-term to loud noise. Examples include listening to loud music, riding motorcycles, or being around other loud machines.  Hearing loss can affect your work and home life. It can make you feel lonely or depressed. You may feel that you have lost your independence. But hearing aids and other devices can help you hear better and feel connected to others.  Follow-up care is a key part of your treatment and safety. Be sure to make and go to all appointments, and call your doctor if you are having problems. It's also a good idea to know your test results and keep a list of the medicines you take.  How can you care for yourself at home?  Avoid loud noises whenever possible. This helps keep your hearing from getting worse.  Always wear hearing protection around loud noises.  Wear a hearing aid as directed.  A professional can help you pick a hearing aid that will work best for you.  You can also get hearing aids over the counter for mild to moderate hearing loss.  Have hearing tests as your doctor suggests. They can show whether your hearing has changed. Your hearing aid may need to be adjusted.  Use other devices as needed. These may include:  Telephone amplifiers and hearing aids that can connect to a television, stereo, radio, or  "microphone.  Devices that use lights or vibrations. These alert you to the doorbell, a ringing telephone, or a baby monitor.  Television closed-captioning. This shows the words at the bottom of the screen. Most new TVs can do this.  TTY (text telephone). This lets you type messages back and forth on the telephone instead of talking or listening. These devices are also called TDD. When messages are typed on the keyboard, they are sent over the phone line to a receiving TTY. The message is shown on a monitor.  Use text messaging, social media, and email if it is hard for you to communicate by telephone.  Try to learn a listening technique called speechreading. It is not lipreading. You pay attention to people's gestures, expressions, posture, and tone of voice. These clues can help you understand what a person is saying. Face the person you are talking to, and have them face you. Make sure the lighting is good. You need to see the other person's face clearly.  Think about counseling if you need help to adjust to your hearing loss.  When should you call for help?  Watch closely for changes in your health, and be sure to contact your doctor if:    You think your hearing is getting worse.     You have new symptoms, such as dizziness or nausea.   Where can you learn more?  Go to https://www.[employer]/patiented  Enter R798 in the search box to learn more about \"Hearing Loss: Care Instructions.\"  Current as of: March 1, 2023               Content Version: 13.7    7598-7274 [email or web address].   Care instructions adapted under license by your healthcare professional. If you have questions about a medical condition or this instruction, always ask your healthcare professional. [email or web address] disclaims any warranty or liability for your use of this information.         "

## 2024-09-06 ENCOUNTER — OFFICE VISIT (OUTPATIENT)
Dept: FAMILY MEDICINE | Facility: CLINIC | Age: 74
End: 2024-09-06
Payer: COMMERCIAL

## 2024-09-06 VITALS
OXYGEN SATURATION: 98 % | TEMPERATURE: 97.4 F | WEIGHT: 315 LBS | BODY MASS INDEX: 44.1 KG/M2 | HEART RATE: 69 BPM | SYSTOLIC BLOOD PRESSURE: 138 MMHG | RESPIRATION RATE: 20 BRPM | HEIGHT: 71 IN | DIASTOLIC BLOOD PRESSURE: 84 MMHG

## 2024-09-06 DIAGNOSIS — D75.1 SECONDARY POLYCYTHEMIA: ICD-10-CM

## 2024-09-06 DIAGNOSIS — I10 PRIMARY HYPERTENSION: ICD-10-CM

## 2024-09-06 DIAGNOSIS — E78.9 DISORDER OF LIPOPROTEIN AND LIPID METABOLISM: ICD-10-CM

## 2024-09-06 DIAGNOSIS — Z00.00 ENCOUNTER FOR MEDICARE ANNUAL WELLNESS EXAM: Primary | ICD-10-CM

## 2024-09-06 DIAGNOSIS — G47.33 OBSTRUCTIVE SLEEP APNEA SYNDROME: ICD-10-CM

## 2024-09-06 DIAGNOSIS — I71.20 THORACIC AORTIC ANEURYSM WITHOUT RUPTURE, UNSPECIFIED PART (H): ICD-10-CM

## 2024-09-06 DIAGNOSIS — I77.79 CELIAC ARTERY DISSECTION (H): ICD-10-CM

## 2024-09-06 DIAGNOSIS — R73.09 ELEVATED GLUCOSE: ICD-10-CM

## 2024-09-06 DIAGNOSIS — K75.81 NONALCOHOLIC STEATOHEPATITIS (NASH): ICD-10-CM

## 2024-09-06 DIAGNOSIS — E66.01 MORBID OBESITY (H): ICD-10-CM

## 2024-09-06 DIAGNOSIS — E55.9 VITAMIN D DEFICIENCY: ICD-10-CM

## 2024-09-06 LAB
ALBUMIN SERPL BCG-MCNC: 4.3 G/DL (ref 3.5–5.2)
ALP SERPL-CCNC: 70 U/L (ref 40–150)
ALT SERPL W P-5'-P-CCNC: 14 U/L (ref 0–70)
ANION GAP SERPL CALCULATED.3IONS-SCNC: 9 MMOL/L (ref 7–15)
AST SERPL W P-5'-P-CCNC: 30 U/L (ref 0–45)
BASOPHILS # BLD AUTO: 0 10E3/UL (ref 0–0.2)
BASOPHILS NFR BLD AUTO: 0 %
BILIRUB SERPL-MCNC: 1.6 MG/DL
BUN SERPL-MCNC: 15 MG/DL (ref 8–23)
CALCIUM SERPL-MCNC: 9.5 MG/DL (ref 8.8–10.4)
CHLORIDE SERPL-SCNC: 99 MMOL/L (ref 98–107)
CHOLEST SERPL-MCNC: 153 MG/DL
CREAT SERPL-MCNC: 0.95 MG/DL (ref 0.67–1.17)
EGFRCR SERPLBLD CKD-EPI 2021: 84 ML/MIN/1.73M2
EOSINOPHIL # BLD AUTO: 0.3 10E3/UL (ref 0–0.7)
EOSINOPHIL NFR BLD AUTO: 3 %
ERYTHROCYTE [DISTWIDTH] IN BLOOD BY AUTOMATED COUNT: 13.1 % (ref 10–15)
FASTING STATUS PATIENT QL REPORTED: ABNORMAL
FASTING STATUS PATIENT QL REPORTED: ABNORMAL
GLUCOSE SERPL-MCNC: 101 MG/DL (ref 70–99)
HBA1C MFR BLD: 5.8 % (ref 0–5.6)
HCO3 SERPL-SCNC: 28 MMOL/L (ref 22–29)
HCT VFR BLD AUTO: 53.7 % (ref 40–53)
HDLC SERPL-MCNC: 48 MG/DL
HGB BLD-MCNC: 17.1 G/DL (ref 13.3–17.7)
IMM GRANULOCYTES # BLD: 0 10E3/UL
IMM GRANULOCYTES NFR BLD: 0 %
LDLC SERPL CALC-MCNC: 71 MG/DL
LYMPHOCYTES # BLD AUTO: 2.3 10E3/UL (ref 0.8–5.3)
LYMPHOCYTES NFR BLD AUTO: 26 %
MCH RBC QN AUTO: 28.7 PG (ref 26.5–33)
MCHC RBC AUTO-ENTMCNC: 31.8 G/DL (ref 31.5–36.5)
MCV RBC AUTO: 90 FL (ref 78–100)
MONOCYTES # BLD AUTO: 0.8 10E3/UL (ref 0–1.3)
MONOCYTES NFR BLD AUTO: 9 %
NEUTROPHILS # BLD AUTO: 5.6 10E3/UL (ref 1.6–8.3)
NEUTROPHILS NFR BLD AUTO: 62 %
NONHDLC SERPL-MCNC: 105 MG/DL
PLATELET # BLD AUTO: 231 10E3/UL (ref 150–450)
POTASSIUM SERPL-SCNC: 4.3 MMOL/L (ref 3.4–5.3)
PROT SERPL-MCNC: 7.4 G/DL (ref 6.4–8.3)
RBC # BLD AUTO: 5.96 10E6/UL (ref 4.4–5.9)
SODIUM SERPL-SCNC: 136 MMOL/L (ref 135–145)
TRIGL SERPL-MCNC: 171 MG/DL
WBC # BLD AUTO: 9.1 10E3/UL (ref 4–11)

## 2024-09-06 PROCEDURE — 83036 HEMOGLOBIN GLYCOSYLATED A1C: CPT | Performed by: FAMILY MEDICINE

## 2024-09-06 PROCEDURE — 80061 LIPID PANEL: CPT | Performed by: FAMILY MEDICINE

## 2024-09-06 PROCEDURE — G0439 PPPS, SUBSEQ VISIT: HCPCS | Performed by: FAMILY MEDICINE

## 2024-09-06 PROCEDURE — 85025 COMPLETE CBC W/AUTO DIFF WBC: CPT | Mod: QW | Performed by: FAMILY MEDICINE

## 2024-09-06 PROCEDURE — 36415 COLL VENOUS BLD VENIPUNCTURE: CPT | Performed by: FAMILY MEDICINE

## 2024-09-06 PROCEDURE — G0008 ADMIN INFLUENZA VIRUS VAC: HCPCS | Performed by: FAMILY MEDICINE

## 2024-09-06 PROCEDURE — 90662 IIV NO PRSV INCREASED AG IM: CPT | Performed by: FAMILY MEDICINE

## 2024-09-06 PROCEDURE — 80053 COMPREHEN METABOLIC PANEL: CPT | Performed by: FAMILY MEDICINE

## 2024-09-06 PROCEDURE — 99214 OFFICE O/P EST MOD 30 MIN: CPT | Mod: 25 | Performed by: FAMILY MEDICINE

## 2024-09-06 RX ORDER — LOVASTATIN 40 MG
TABLET ORAL
Qty: 90 TABLET | Refills: 2 | Status: SHIPPED | OUTPATIENT
Start: 2024-09-06 | End: 2024-09-06

## 2024-09-06 RX ORDER — POTASSIUM CHLORIDE 1500 MG/1
TABLET, EXTENDED RELEASE ORAL
Qty: 90 TABLET | Refills: 3 | Status: SHIPPED | OUTPATIENT
Start: 2024-09-06

## 2024-09-06 RX ORDER — ATENOLOL AND CHLORTHALIDONE TABLET 100; 25 MG/1; MG/1
1 TABLET ORAL DAILY
Qty: 90 TABLET | Refills: 2 | Status: SHIPPED | OUTPATIENT
Start: 2024-09-06 | End: 2024-09-06

## 2024-09-06 RX ORDER — ATENOLOL AND CHLORTHALIDONE TABLET 100; 25 MG/1; MG/1
1 TABLET ORAL DAILY
Qty: 90 TABLET | Refills: 3 | Status: SHIPPED | OUTPATIENT
Start: 2024-09-06

## 2024-09-06 RX ORDER — POTASSIUM CHLORIDE 1500 MG/1
TABLET, EXTENDED RELEASE ORAL
Qty: 90 TABLET | Refills: 2 | Status: SHIPPED | OUTPATIENT
Start: 2024-09-06 | End: 2024-09-06

## 2024-09-06 RX ORDER — LOVASTATIN 40 MG
TABLET ORAL
Qty: 90 TABLET | Refills: 3 | Status: SHIPPED | OUTPATIENT
Start: 2024-09-06

## 2024-09-06 RX ORDER — LOSARTAN POTASSIUM 25 MG/1
TABLET ORAL
Qty: 90 TABLET | Refills: 3 | Status: SHIPPED | OUTPATIENT
Start: 2024-09-06

## 2024-09-06 RX ORDER — LOSARTAN POTASSIUM 25 MG/1
TABLET ORAL
Qty: 90 TABLET | Refills: 2 | Status: SHIPPED | OUTPATIENT
Start: 2024-09-06 | End: 2024-09-06

## 2024-09-06 NOTE — PROGRESS NOTES
"Preventive Care Visit  [facility]  Otilio Moya MD, Family Medicine  Sep 6, 2024      Assessment & Plan     Encounter for Medicare annual wellness exam  Healthcare maintenance and advance directives reviewed his daughters are his POA and they both have copies    Primary hypertension  Under reasonable control with meds as below recommended he start tracking his pressures at home  - CBC with Platelets & Differential  - Comprehensive metabolic panel  - atenolol-chlorthalidone (TENORETIC) 100-25 MG tablet; Take 1 tablet by mouth daily.  - losartan (COZAAR) 25 MG tablet; TAKE ONE TABLET (25 MG) BY MOUTH DAILY  - potassium chloride qamar ER (KLOR-CON M20) 20 MEQ CR tablet; TAKE ONE TABLET(20 MEQ) BY MOUTH DAILY STRENGTH: 20 MEQ    Disorder of lipoprotein and lipid metabolism  Controlled with lovastatin  - Lipid panel reflex to direct LDL Fasting  - lovastatin (MEVACOR) 40 MG tablet; TAKE ONE TABLET (40 MG) BY MOUTH DAILY    Nonalcoholic steatohepatitis (NUÑEZ)  Unchanged    Secondary polycythemia  No longer an issue    Celiac artery dissection (H24)  Stable by CTA last year repeat in 2 years    Thoracic aortic aneurysm without rupture, unspecified part (H24)  Stable by CT last year repeat in 2 years    Elevated glucose  Yearly A1c  - Hemoglobin A1c    Vitamin D deficiency  On supplement    Obstructive sleep apnea syndrome  Has CPAP            BMI  Estimated body mass index is 43.93 kg/m  as calculated from the following:    Height as of this encounter: 1.803 m (5' 11\").    Weight as of this encounter: 142.9 kg (315 lb).       Counseling  Appropriate preventive services were addressed with this patient via screening, questionnaire, or discussion as appropriate for fall prevention, nutrition, physical activity, Tobacco-use cessation, social engagement, weight loss and cognition.  Checklist reviewing preventive services available has been given to the patient.  Reviewed patient's diet, " addressing concerns and/or questions.   He is at risk for lack of exercise and has been provided with information to increase physical activity for the benefit of his well-being.   The patient was instructed to see the dentist every 6 months.   He is at risk for psychosocial distress and has been provided with information to reduce risk.           Priyanka Hogue is a 73 year old, presenting for the following: Overall doing well.  Lost his wife to cancer last winter.  Lives independently.  No falls.  Everything is on 1 level.  Has very supportive daughters.  Enjoys his grandkids.  Retired mortician  Medicare Visit        9/6/2024    10:15 AM   Additional Questions   Roomed by Desiree ADHIKARI       Health Care Directive  Patient does not have a Health Care Directive or Living Will: Patient states has Advance Directive and will bring in a copy to clinic.    HPI        Patient Active Problem List   Diagnosis    Morbid obesity (H)    Nonalcoholic steatohepatitis (NUÑEZ)    Obstructive sleep apnea syndrome    Impaired glucose tolerance    Primary hypertension    Disorder of lipoprotein and lipid metabolism    Thoracic aortic aneurysm (TAA) (H24)    Secondary polycythemia    Vitamin D deficiency    Seasonal allergic rhinitis    Malignant basal cell neoplasm of skin    Celiac artery dissection (H24)     Current Outpatient Medications   Medication Sig Dispense Refill    atenolol-chlorthalidone (TENORETIC) 100-25 MG tablet Take 1 tablet by mouth daily. 90 tablet 3    losartan (COZAAR) 25 MG tablet TAKE ONE TABLET (25 MG) BY MOUTH DAILY 90 tablet 3    lovastatin (MEVACOR) 40 MG tablet TAKE ONE TABLET (40 MG) BY MOUTH DAILY 90 tablet 3    Multiple Vitamins-Minerals (MULTIVITAL-M PO) Take 1 tablet by mouth daily      potassium chloride qamar ER (KLOR-CON M20) 20 MEQ CR tablet TAKE ONE TABLET(20 MEQ) BY MOUTH DAILY STRENGTH: 20 MEQ 90 tablet 3     No current facility-administered medications for this visit.           9/6/2024    General Health   How would you rate your overall physical health? Good   Feel stress (tense, anxious, or unable to sleep) Only a little      (!) STRESS CONCERN      9/6/2024   Nutrition   Diet: Regular (no restrictions)            9/6/2024   Exercise   Days per week of moderate/strenous exercise 1 day      (!) EXERCISE CONCERN      9/6/2024   Social Factors   Frequency of gathering with friends or relatives Once a week   Worry food won't last until get money to buy more No   Food not last or not have enough money for food? No   Do you have housing? (Housing is defined as stable permanent housing and does not include staying ouside in a car, in a tent, in an abandoned building, in an overnight shelter, or couch-surfing.) Yes   Are you worried about losing your housing? No   Lack of transportation? No   Unable to get utilities (heat,electricity)? No            9/6/2024   Fall Risk   Fallen 2 or more times in the past year? No    No   Trouble with walking or balance? No    No       Multiple values from one day are sorted in reverse-chronological order          9/6/2024   Activities of Daily Living- Home Safety   Needs help with the following daily activites None of the above   Safety concerns in the home None of the above            9/6/2024   Dental   Dentist two times every year? (!) NO            9/6/2024   Hearing Screening   Hearing concerns? None of the above            9/6/2024   Driving Risk Screening   Patient/family members have concerns about driving No            9/6/2024   General Alertness/Fatigue Screening   Have you been more tired than usual lately? No            9/6/2024   Urinary Incontinence Screening   Bothered by leaking urine in past 6 months No            9/6/2024   TB Screening   Were you born outside of the [place]? No            Today's PHQ-2 Score:       9/6/2024    10:11 AM   PHQ-2 ( 1999 Pfizer)   Q1: Little interest or pleasure in doing things 0   Q2: Feeling down, depressed or hopeless 0    PHQ-2 Score 0   Q1: Little interest or pleasure in doing things Not at all   Q2: Feeling down, depressed or hopeless Not at all   PHQ-2 Score 0           9/6/2024   Substance Use   Alcohol more than 3/day or more than 7/wk No   Do you have a current opioid prescription? No   How severe/bad is pain from 1 to 10? 0/10 (No Pain)   Do you use any other substances recreationally? No        Social History     Tobacco Use    Smoking status: Never     Passive exposure: Never    Smokeless tobacco: Never   Vaping Use    Vaping status: Never Used   Substance Use Topics    Alcohol use: Yes     Comment: 4 drinks per week           9/6/2024   AAA Screening   Family history of Abdominal Aortic Aneurysm (AAA)? No      ASCVD Risk   The 10-year ASCVD risk score (Eric RAMÍREZ, et al., 2019) is: 31.9%    Values used to calculate the score:      Age: 73 years      Sex: Male      Is Non- : No      Diabetic: No      Tobacco smoker: No      Systolic Blood Pressure: 152 mmHg      Is BP treated: Yes      HDL Cholesterol: 58 mg/dL      Total Cholesterol: 178 mg/dL            Reviewed and updated as needed this visit by Provider                      Current providers sharing in care for this patient include:  Patient Care Team:  Otilio Moya MD as PCP - General (Family Medicine)  Otilio Moya MD as Assigned PCP    The following health maintenance items are reviewed in Epic and correct as of today:  Health Maintenance   Topic Date Due    RSV VACCINE (1 - 1-dose 60+ series) Never done    ANNUAL REVIEW OF HM ORDERS  11/08/2023    MEDICARE ANNUAL WELLNESS VISIT  08/02/2024    LIPID  08/02/2024    INFLUENZA VACCINE (1) 09/01/2024    COVID-19 Vaccine (6 - 2023-24 season) 09/01/2024    FALL RISK ASSESSMENT  09/06/2025    GLUCOSE  08/02/2026    ADVANCE CARE PLANNING  08/02/2028    COLORECTAL CANCER SCREENING  10/04/2028    DTAP/TDAP/TD IMMUNIZATION (4 - Td or Tdap) 12/14/2028    PHQ-2 (once per calendar  "year)  Completed    Pneumococcal Vaccine: 65+ Years  Completed    ZOSTER IMMUNIZATION  Completed    HPV IMMUNIZATION  Aged Out    MENINGITIS IMMUNIZATION  Aged Out    RSV MONOCLONAL ANTIBODY  Aged Out    HEPATITIS C SCREENING  Discontinued         Review of Systems  Constitutional, HEENT, cardiovascular, pulmonary, gi and gu systems are negative, except as otherwise noted.     Objective    Exam  BP (!) 152/95   Pulse 69   Temp 97.4  F (36.3  C)   Resp 20   Ht 1.803 m (5' 11\")   Wt 142.9 kg (315 lb)   SpO2 98%   BMI 43.93 kg/m     Estimated body mass index is 43.93 kg/m  as calculated from the following:    Height as of this encounter: 1.803 m (5' 11\").    Weight as of this encounter: 142.9 kg (315 lb).    Physical Exam  GENERAL: alert and no distress  NECK: no adenopathy, no asymmetry, masses, or scars  RESP: lungs clear to auscultation - no rales, rhonchi or wheezes  CV: regular rate and rhythm, normal S1 S2, no S3 or S4, no murmur, click or rub,   ABDOMEN: soft, nontender, no hepatosplenomegaly, no masses and bowel sounds normal  MS: no gross musculoskeletal defects noted, trace edema        9/6/2024   Mini Cog   Mini-Cog Not Completed (choose reason) Patient declines                 Signed Electronically by: Otilio Moya MD    "

## 2024-09-06 NOTE — LETTER
September 6, 2024      Mykel Elizabeth Jr.  [address] [facility] [address]        Dear ,    We are writing to inform you of your test results.    Your test results fall within the expected range(s) or remain unchanged from previous results.  Please continue with current treatment plan.    Resulted Orders   Comprehensive metabolic panel   Result Value Ref Range    Sodium 136 135 - 145 mmol/L    Potassium 4.3 3.4 - 5.3 mmol/L    Carbon Dioxide (CO2) 28 22 - 29 mmol/L    Anion Gap 9 7 - 15 mmol/L    Urea Nitrogen 15.0 8.0 - 23.0 mg/dL    Creatinine 0.95 0.67 - 1.17 mg/dL    GFR Estimate 84 >60 mL/min/1.73m2      Comment:      eGFR calculated using 2021 CKD-EPI equation.    Calcium 9.5 8.8 - 10.4 mg/dL      Comment:      Reference intervals for this test were updated on 7/16/2024 to reflect our healthy population more accurately. There may be differences in the flagging of prior results with similar values performed with this method. Those prior results can be interpreted in the context of the updated reference intervals.    Chloride 99 98 - 107 mmol/L    Glucose 101 (H) 70 - 99 mg/dL    Alkaline Phosphatase 70 40 - 150 U/L    AST 30 0 - 45 U/L    ALT 14 0 - 70 U/L    Protein Total 7.4 6.4 - 8.3 g/dL    Albumin 4.3 3.5 - 5.2 g/dL    Bilirubin Total 1.6 (H) <=1.2 mg/dL    Patient Fasting > 8hrs? Unknown    Lipid panel reflex to direct LDL Fasting   Result Value Ref Range    Cholesterol 153 <200 mg/dL    Triglycerides 171 (H) <150 mg/dL    Direct Measure HDL 48 >=40 mg/dL    LDL Cholesterol Calculated 71 <=100 mg/dL    Non HDL Cholesterol 105 <130 mg/dL    Patient Fasting > 8hrs? Unknown     Narrative    Cholesterol  Desirable:  <200 mg/dL    Triglycerides  Normal:  Less than 150 mg/dL  Borderline High:  150-199 mg/dL  High:  200-499 mg/dL  Very High:  Greater than or equal to 500 mg/dL    Direct Measure HDL  Female:  Greater than or equal to 50 mg/dL   Male:  Greater than or equal to 40  mg/dL    LDL Cholesterol  Desirable:  <100mg/dL  Above Desirable:  100-129 mg/dL   Borderline High:  130-159 mg/dL   High:  160-189 mg/dL   Very High:  >= 190 mg/dL    Non HDL Cholesterol  Desirable:  130 mg/dL  Above Desirable:  130-159 mg/dL  Borderline High:  160-189 mg/dL  High:  190-219 mg/dL  Very High:  Greater than or equal to 220 mg/dL   Hemoglobin A1c   Result Value Ref Range    Hemoglobin A1C 5.8 (H) 0.0 - 5.6 %      Comment:      Normal <5.7%   Prediabetes 5.7-6.4%    Diabetes 6.5% or higher     Note: Adopted from ADA consensus guidelines.   CBC with platelets and differential   Result Value Ref Range    WBC Count 9.1 4.0 - 11.0 10e3/uL    RBC Count 5.96 (H) 4.40 - 5.90 10e6/uL    Hemoglobin 17.1 13.3 - 17.7 g/dL    Hematocrit 53.7 (H) 40.0 - 53.0 %    MCV 90 78 - 100 fL    MCH 28.7 26.5 - 33.0 pg    MCHC 31.8 31.5 - 36.5 g/dL    RDW 13.1 10.0 - 15.0 %    Platelet Count 231 150 - 450 10e3/uL    % Neutrophils 62 %    % Lymphocytes 26 %    % Monocytes 9 %    % Eosinophils 3 %    % Basophils 0 %    % Immature Granulocytes 0 %    Absolute Neutrophils 5.6 1.6 - 8.3 10e3/uL    Absolute Lymphocytes 2.3 0.8 - 5.3 10e3/uL    Absolute Monocytes 0.8 0.0 - 1.3 10e3/uL    Absolute Eosinophils 0.3 0.0 - 0.7 10e3/uL    Absolute Basophils 0.0 0.0 - 0.2 10e3/uL    Absolute Immature Granulocytes 0.0 <=0.4 10e3/uL       If you have any questions or concerns, please call the clinic at the number listed above.       Sincerely,      Otilio Moya MD